# Patient Record
Sex: MALE | Race: WHITE | NOT HISPANIC OR LATINO | Employment: OTHER | URBAN - METROPOLITAN AREA
[De-identification: names, ages, dates, MRNs, and addresses within clinical notes are randomized per-mention and may not be internally consistent; named-entity substitution may affect disease eponyms.]

---

## 2018-08-27 RX ORDER — LORATADINE 10 MG/1
10 TABLET ORAL DAILY
COMMUNITY
End: 2018-08-28

## 2018-08-27 RX ORDER — ESOMEPRAZOLE MAGNESIUM 40 MG/1
40 CAPSULE, DELAYED RELEASE ORAL
COMMUNITY
End: 2018-08-28

## 2018-08-27 RX ORDER — NAPROXEN 500 MG/1
500 TABLET ORAL 2 TIMES DAILY WITH MEALS
COMMUNITY
End: 2018-08-28

## 2018-08-27 RX ORDER — MONTELUKAST SODIUM 10 MG/1
10 TABLET ORAL NIGHTLY
COMMUNITY
End: 2018-08-28

## 2018-08-27 RX ORDER — ALBUTEROL SULFATE 90 UG/1
2 AEROSOL, METERED RESPIRATORY (INHALATION) EVERY 4 HOURS PRN
COMMUNITY
End: 2018-08-28

## 2018-08-27 RX ORDER — ALBUTEROL SULFATE 0.63 MG/3ML
1 SOLUTION RESPIRATORY (INHALATION) EVERY 6 HOURS PRN
COMMUNITY
End: 2018-08-28

## 2018-08-27 RX ORDER — CYCLOBENZAPRINE HCL 10 MG
10 TABLET ORAL 3 TIMES DAILY PRN
COMMUNITY
End: 2018-08-28

## 2018-08-28 ENCOUNTER — OFFICE VISIT (OUTPATIENT)
Dept: NEUROSURGERY | Facility: CLINIC | Age: 28
End: 2018-08-28

## 2018-08-28 VITALS
TEMPERATURE: 98.1 F | DIASTOLIC BLOOD PRESSURE: 82 MMHG | WEIGHT: 211 LBS | HEIGHT: 74 IN | SYSTOLIC BLOOD PRESSURE: 130 MMHG | BODY MASS INDEX: 27.08 KG/M2

## 2018-08-28 DIAGNOSIS — M54.10 RADICULAR SYNDROME OF LEFT LEG: ICD-10-CM

## 2018-08-28 DIAGNOSIS — M51.26 HERNIATED LUMBAR INTERVERTEBRAL DISC: Primary | ICD-10-CM

## 2018-08-28 DIAGNOSIS — M51.36 DEGENERATIVE DISC DISEASE, LUMBAR: ICD-10-CM

## 2018-08-28 PROCEDURE — 99243 OFF/OP CNSLTJ NEW/EST LOW 30: CPT | Performed by: NEUROLOGICAL SURGERY

## 2018-08-28 RX ORDER — GABAPENTIN 300 MG/1
300 CAPSULE ORAL 3 TIMES DAILY
COMMUNITY
End: 2018-08-28

## 2018-08-28 RX ORDER — TRAMADOL HYDROCHLORIDE 50 MG/1
50 TABLET ORAL EVERY 8 HOURS PRN
Qty: 45 TABLET | Refills: 0 | Status: SHIPPED | OUTPATIENT
Start: 2018-08-28 | End: 2019-01-14 | Stop reason: SDUPTHER

## 2018-08-28 RX ORDER — NABUMETONE 750 MG/1
750 TABLET, FILM COATED ORAL 2 TIMES DAILY
Qty: 60 TABLET | Refills: 1 | Status: SHIPPED | OUTPATIENT
Start: 2018-08-28 | End: 2019-01-14 | Stop reason: CLARIF

## 2018-08-28 RX ORDER — METHOCARBAMOL 750 MG/1
750 TABLET, FILM COATED ORAL NIGHTLY
Qty: 30 TABLET | Refills: 1 | Status: SHIPPED | OUTPATIENT
Start: 2018-08-28 | End: 2019-01-14 | Stop reason: SDUPTHER

## 2018-08-28 NOTE — PROGRESS NOTES
Jm Henry  1990  [id number]      Chief Complaint   Patient presents with   • Back Pain   • Leg Pain     left       HISTORY OF PRESENT ILLNESS:  [This is a 28-year-old male presenting with a chief complaint of back and left leg pain of several months duration which has 10 to wax and wane.  He has some peers that are better than others yet it is always present.  A lumbar MRI was performed and he is referred for surgical consultation. ]    History reviewed. No pertinent past medical history.    Past Surgical History:   Procedure Laterality Date   • CHOLECYSTECTOMY     • KNEE SURGERY         History reviewed. No pertinent family history.    Social History     Social History   • Marital status:      Spouse name: N/A   • Number of children: N/A   • Years of education: N/A     Occupational History   • Not on file.     Social History Main Topics   • Smoking status: Never Smoker   • Smokeless tobacco: Never Used   • Alcohol use Yes   • Drug use: No   • Sexual activity: Defer     Other Topics Concern   • Not on file     Social History Narrative   • No narrative on file       No Known Allergies      Current Outpatient Prescriptions:   •  gabapentin (NEURONTIN) 300 MG capsule, Take 300 mg by mouth 3 (Three) Times a Day., Disp: , Rfl:   •  naproxen (NAPROSYN) 500 MG tablet, Take 500 mg by mouth 2 (Two) Times a Day With Meals., Disp: , Rfl:     Review of Systems   Constitutional: Negative for activity change, appetite change, chills, diaphoresis, fatigue, fever and unexpected weight change.   HENT: Negative for congestion, dental problem, drooling, ear discharge, ear pain, facial swelling, hearing loss, mouth sores, nosebleeds, postnasal drip, rhinorrhea, sinus pressure, sneezing, sore throat, tinnitus, trouble swallowing and voice change.    Eyes: Negative for photophobia, pain, discharge, redness, itching and visual disturbance.   Respiratory: Negative for apnea, cough, choking, chest tightness, shortness of  "breath, wheezing and stridor.    Cardiovascular: Negative for chest pain, palpitations and leg swelling.   Gastrointestinal: Negative for abdominal distention, abdominal pain, anal bleeding, blood in stool, constipation, diarrhea, nausea, rectal pain and vomiting.   Endocrine: Negative for cold intolerance, heat intolerance, polydipsia, polyphagia and polyuria.   Genitourinary: Positive for frequency. Negative for decreased urine volume, difficulty urinating, dysuria, enuresis, flank pain, genital sores, hematuria and urgency.   Musculoskeletal: Positive for back pain. Negative for arthralgias, gait problem, joint swelling, myalgias, neck pain and neck stiffness.   Skin: Negative for color change, pallor, rash and wound.   Allergic/Immunologic: Negative for environmental allergies, food allergies and immunocompromised state.   Neurological: Positive for weakness and numbness. Negative for dizziness, tremors, seizures, syncope, facial asymmetry, speech difficulty, light-headedness and headaches.   Hematological: Negative for adenopathy. Does not bruise/bleed easily.   Psychiatric/Behavioral: Negative for agitation, behavioral problems, confusion, decreased concentration, dysphoric mood, hallucinations, self-injury, sleep disturbance and suicidal ideas. The patient is not nervous/anxious and is not hyperactive.        Vitals:    08/28/18 1235   BP: 130/82   BP Location: Right arm   Patient Position: Sitting   Cuff Size: Adult   Temp: 98.1 °F (36.7 °C)   TempSrc: Temporal Artery    Weight: 95.7 kg (211 lb)   Height: 188 cm (74\")       Neurological Examination:    Mental status/speech: The patient is alert and oriented.  Speech is clear without aphysia or dysarthria.  No overt cognitive deficits.    Cranial nerve examination:    Olfaction: Smell is intact.  Vision: Vision is intact without visual field abnormalities.  Funduscopic examination is normal.  No pupillary irregularity.  Ocular motor examination: The " extraocular muscles are intact.  There is no diplopia.  The pupil is round and reactive to both light and accommodation.  There is no nystagmus.  Facial movement/sensation: There is no facial weakness.  Sensation is intact in the first, second, and third divisions of the trigeminal nerve.  The corneal reflex is intact.  Auditory: Hearing is intact to finger rub bilaterally.  Cranial nerves IX, X, XI, XII: Phonation is normal.  No dysphagia.  Tongue is protruded in the midline without atrophy.  The gag reflex is intact.  Shoulder shrug is normal.    Musculoligamentous ligamentous examination: [He has a positive straight leg raising on the left side.  There is no weakness, sensory loss or reflex asymmetry.  Diminished range of motion. ]    Medical Decision Making:     Diagnostic Data Set:  The lumbar MRI shows disc protrusion L4-L5 toward the left side providing anatomical/clinical correlation.      Assessment:  Symptomatic disc herniation          Recommendations:  I would recommend a continuation of conservative-ism.  I've send him to physical therapy; have given him a prescription of Relafen 750 mg twice a day, Robaxin 750 mg twice a day and tramadol for pain management.  He will call me on as-needed basis.  Should his symptoms persist and/or worsen surgery clearly is an option for him however I would want to make certain we have exhausted all other options.  I have discussed this with him in detail.        I greatly appreciate the opportunity to see and evaluate this individual.  If you have questions or concerns regarding issues that I may have overlooked please call me at any time: [phone].  Diallo Tavera M.D.  [facility]  [address] [place] [address]    Scribed for Linus Tavera MD by Ann Dahl CMA. 8/28/2018  1:17 PM     I have read and concur with the information provided by the scribe.  Linus Taevra MD

## 2019-01-09 ENCOUNTER — TELEPHONE (OUTPATIENT)
Dept: NEUROSURGERY | Facility: CLINIC | Age: 29
End: 2019-01-09

## 2019-01-09 NOTE — TELEPHONE ENCOUNTER
Provider:  Liang  Caller: Jm  Phone #:  [phone]  Last visit:   8/28/18  Next visit: n/a    KAY:         Reason for call:           Pt stating that no real benefit came from PT, is requesting f/u to discuss next step/surgery.

## 2019-01-14 ENCOUNTER — OFFICE VISIT (OUTPATIENT)
Dept: NEUROSURGERY | Facility: CLINIC | Age: 29
End: 2019-01-14

## 2019-01-14 VITALS
DIASTOLIC BLOOD PRESSURE: 72 MMHG | BODY MASS INDEX: 26.82 KG/M2 | WEIGHT: 209 LBS | SYSTOLIC BLOOD PRESSURE: 112 MMHG | TEMPERATURE: 98.5 F | HEIGHT: 74 IN

## 2019-01-14 DIAGNOSIS — M51.26 HERNIATED LUMBAR INTERVERTEBRAL DISC: Primary | ICD-10-CM

## 2019-01-14 DIAGNOSIS — M54.10 RADICULAR SYNDROME OF LEFT LEG: ICD-10-CM

## 2019-01-14 DIAGNOSIS — M51.36 DEGENERATIVE DISC DISEASE, LUMBAR: ICD-10-CM

## 2019-01-14 PROCEDURE — 99213 OFFICE O/P EST LOW 20 MIN: CPT | Performed by: NEUROLOGICAL SURGERY

## 2019-01-14 RX ORDER — METHOCARBAMOL 750 MG/1
750 TABLET, FILM COATED ORAL NIGHTLY
Qty: 30 TABLET | Refills: 1 | Status: SHIPPED | OUTPATIENT
Start: 2019-01-14 | End: 2019-01-24 | Stop reason: HOSPADM

## 2019-01-14 RX ORDER — MELOXICAM 7.5 MG/1
7.5 TABLET ORAL 2 TIMES DAILY
Qty: 30 TABLET | Refills: 0 | Status: SHIPPED | OUTPATIENT
Start: 2019-01-14 | End: 2019-01-24 | Stop reason: HOSPADM

## 2019-01-14 RX ORDER — TRAMADOL HYDROCHLORIDE 50 MG/1
50 TABLET ORAL EVERY 8 HOURS PRN
Qty: 45 TABLET | Refills: 0 | Status: ON HOLD | OUTPATIENT
Start: 2019-01-14 | End: 2019-01-23 | Stop reason: SDUPTHER

## 2019-01-14 NOTE — PROGRESS NOTES
Jm Henry  1990  [id number]                        CHIEF COMPLAINT:  Left leg pain          MEDICAL HISTORY SINCE LAST ENCOUNTER:  This 28-year-old male had a clinical diagnosis of disc protrusion at L5-S1 treated conservatively approximately 8 months ago.  He did well all did have recurrence in the change in his symptoms have gotten progressively worse.  He reports for reevaluation.  He has severe pain in his back radiating into his left leg.  Physical therapy has not been helpful.          History reviewed. No pertinent past medical history.           Past Surgical History:   Procedure Laterality Date   • CHOLECYSTECTOMY     • KNEE SURGERY              History reviewed. No pertinent family history.           Social History     Socioeconomic History   • Marital status:      Spouse name: Not on file   • Number of children: Not on file   • Years of education: Not on file   • Highest education level: Not on file   Social Needs   • Financial resource strain: Not on file   • Food insecurity - worry: Not on file   • Food insecurity - inability: Not on file   • Transportation needs - medical: Not on file   • Transportation needs - non-medical: Not on file   Occupational History   • Not on file   Tobacco Use   • Smoking status: Never Smoker   • Smokeless tobacco: Never Used   Substance and Sexual Activity   • Alcohol use: Yes   • Drug use: No   • Sexual activity: Defer   Other Topics Concern   • Not on file   Social History Narrative   • Not on file            No Known Allergies           Current Outpatient Medications:   •  methocarbamol (ROBAXIN) 750 MG tablet, Take 1 tablet by mouth Every Night., Disp: 30 tablet, Rfl: 1  •  nabumetone (RELAFEN) 750 MG tablet, Take 1 tablet by mouth 2 (Two) Times a Day., Disp: 60 tablet, Rfl: 1  •  traMADol (ULTRAM) 50 MG tablet, Take 1 tablet by mouth Every 8 (Eight) Hours As Needed for Moderate Pain ., Disp: 45 tablet, Rfl: 0         Review of Systems   Constitutional:  Positive for activity change and fatigue. Negative for appetite change, chills, diaphoresis, fever and unexpected weight change.   HENT: Negative for congestion, dental problem, drooling, ear discharge, ear pain, facial swelling, hearing loss, mouth sores, nosebleeds, postnasal drip, rhinorrhea, sinus pressure, sneezing, sore throat, tinnitus, trouble swallowing and voice change.    Eyes: Negative for photophobia, pain, discharge, redness, itching and visual disturbance.   Respiratory: Negative for apnea, cough, choking, chest tightness, shortness of breath, wheezing and stridor.    Cardiovascular: Negative for chest pain, palpitations and leg swelling.   Gastrointestinal: Negative for abdominal distention, abdominal pain, anal bleeding, blood in stool, constipation, diarrhea, nausea, rectal pain and vomiting.   Endocrine: Negative for cold intolerance, heat intolerance, polydipsia, polyphagia and polyuria.   Genitourinary: Negative for decreased urine volume, difficulty urinating, dysuria, enuresis, flank pain, frequency, genital sores, hematuria and urgency.   Musculoskeletal: Positive for arthralgias, back pain and gait problem. Negative for joint swelling, myalgias, neck pain and neck stiffness.   Skin: Negative for color change, pallor, rash and wound.   Allergic/Immunologic: Negative for environmental allergies, food allergies and immunocompromised state.   Neurological: Positive for dizziness, weakness, light-headedness and numbness. Negative for tremors, seizures, syncope, facial asymmetry, speech difficulty and headaches.   Hematological: Negative for adenopathy. Does not bruise/bleed easily.   Psychiatric/Behavioral: Positive for decreased concentration. Negative for agitation, behavioral problems, confusion, dysphoric mood, hallucinations, self-injury, sleep disturbance and suicidal ideas. The patient is not nervous/anxious and is not hyperactive.                Vitals:    01/14/19 1543   BP: 112/72   BP  "Location: Right arm   Patient Position: Sitting   Temp: 98.5 °F (36.9 °C)   TempSrc: Temporal   Weight: 94.8 kg (209 lb)   Height: 188 cm (74\")               EXAMINATION:  Has a positive straight leg raise on the left side and absent Achilles reflex (this was present previously).  No weakness or sensory loss.            MEDICAL DECISION MAKING:  His neurologic examination is consistent with an S1 radiculopathy secondary to disc protrusion L5-S1 on the left side.           ASSESSMENT/DISPOSITION: I will repeat his lumbar MRI.  Should this provide clinical correlation I would recommend discectomy.  We will get this done and I will keep you informed.               I APPRECIATE THE OPPORTUNITY OF THIS REFERRAL. PLEASE CALL IF ANY       QUESTIONS [phone]    Scribed for Linus Tavera MD by Ann Dahl CMA. 1/14/2019 3:56 PM     I have read and concur with the information provided by the scribe.  Linus Tavera MD      "

## 2019-01-14 NOTE — PATIENT INSTRUCTIONS
Call Ann after MRI- she will obtain scans and have Dr. Tavera review and call you back.       Call Dr. Tavera on a Monday or Tuesday with an update.    Ask for Carie () and leave a message for  Dr. Tavera.   He will call you back at the end of the day as soon as he can.     [phone]

## 2019-01-17 ENCOUNTER — TELEPHONE (OUTPATIENT)
Dept: NEUROSURGERY | Facility: CLINIC | Age: 29
End: 2019-01-17

## 2019-01-17 NOTE — TELEPHONE ENCOUNTER
Provider:  Liang  Caller: ojse  Time of call:   8:56  Phone #:  [phone]  Surgery:  NA  Surgery Date:  NA  Last visit:   1/14/2019  Next visit: LEI VILLANUEVA:         Reason for call:         Pt called stating that he had his MRI done at [facility] and wanted to know if he needed to mail the disc to us.  I spoke with Elham and she has requested the pt overnight the disc.  Pt has been informed and verbalized understanding.

## 2019-01-21 ENCOUNTER — PREP FOR SURGERY (OUTPATIENT)
Dept: OTHER | Facility: HOSPITAL | Age: 29
End: 2019-01-21

## 2019-01-21 DIAGNOSIS — M51.26 LUMBAR HERNIATED DISC: Primary | ICD-10-CM

## 2019-01-21 RX ORDER — IBUPROFEN 800 MG/1
800 TABLET ORAL ONCE
Status: CANCELLED | OUTPATIENT
Start: 2019-01-21 | End: 2019-01-21

## 2019-01-21 RX ORDER — SODIUM CHLORIDE 0.9 % (FLUSH) 0.9 %
3 SYRINGE (ML) INJECTION EVERY 12 HOURS SCHEDULED
Status: CANCELLED | OUTPATIENT
Start: 2019-01-21

## 2019-01-21 RX ORDER — ACETAMINOPHEN 325 MG/1
650 TABLET ORAL ONCE
Status: CANCELLED | OUTPATIENT
Start: 2019-01-21 | End: 2019-01-21

## 2019-01-21 RX ORDER — SODIUM CHLORIDE, SODIUM LACTATE, POTASSIUM CHLORIDE, CALCIUM CHLORIDE 600; 310; 30; 20 MG/100ML; MG/100ML; MG/100ML; MG/100ML
100 INJECTION, SOLUTION INTRAVENOUS CONTINUOUS
Status: CANCELLED | OUTPATIENT
Start: 2019-01-21

## 2019-01-21 RX ORDER — CEFAZOLIN SODIUM 2 G/100ML
2 INJECTION, SOLUTION INTRAVENOUS ONCE
Status: CANCELLED | OUTPATIENT
Start: 2019-01-21 | End: 2019-01-21

## 2019-01-21 RX ORDER — HYDROCODONE BITARTRATE AND ACETAMINOPHEN 7.5; 325 MG/1; MG/1
1 TABLET ORAL ONCE
Status: CANCELLED | OUTPATIENT
Start: 2019-01-21 | End: 2019-01-21

## 2019-01-21 RX ORDER — SODIUM CHLORIDE 0.9 % (FLUSH) 0.9 %
3-10 SYRINGE (ML) INJECTION AS NEEDED
Status: CANCELLED | OUTPATIENT
Start: 2019-01-21

## 2019-01-21 RX ORDER — PREGABALIN 150 MG/1
150 CAPSULE ORAL ONCE
Status: CANCELLED | OUTPATIENT
Start: 2019-01-21 | End: 2019-01-21

## 2019-01-21 NOTE — H&P (VIEW-ONLY)
Jm Henry  1990  [id number]                          CHIEF COMPLAINT:  Left leg pain           MEDICAL HISTORY SINCE LAST ENCOUNTER:  This 28-year-old male had a clinical diagnosis of disc protrusion at L4-L5 treated conservatively approximately 8 months ago.  He did well all did have recurrence in the change in his symptoms have gotten progressively worse.  He reports for reevaluation.  He has severe pain in his back radiating into his left leg.  Physical therapy has not been helpful.             Medical History   History reviewed. No pertinent past medical history.               Surgical History         Past Surgical History:   Procedure Laterality Date   • CHOLECYSTECTOMY       • KNEE SURGERY                     History reviewed. No pertinent family history.            Social History               Socioeconomic History   • Marital status:        Spouse name: Not on file   • Number of children: Not on file   • Years of education: Not on file   • Highest education level: Not on file   Social Needs   • Financial resource strain: Not on file   • Food insecurity - worry: Not on file   • Food insecurity - inability: Not on file   • Transportation needs - medical: Not on file   • Transportation needs - non-medical: Not on file   Occupational History   • Not on file   Tobacco Use   • Smoking status: Never Smoker   • Smokeless tobacco: Never Used   Substance and Sexual Activity   • Alcohol use: Yes   • Drug use: No   • Sexual activity: Defer   Other Topics Concern   • Not on file   Social History Narrative   • Not on file                 No Known Allergies            Current Outpatient Medications:   •  methocarbamol (ROBAXIN) 750 MG tablet, Take 1 tablet by mouth Every Night., Disp: 30 tablet, Rfl: 1  •  nabumetone (RELAFEN) 750 MG tablet, Take 1 tablet by mouth 2 (Two) Times a Day., Disp: 60 tablet, Rfl: 1  •  traMADol (ULTRAM) 50 MG tablet, Take 1 tablet by mouth Every 8 (Eight) Hours As Needed for  Moderate Pain ., Disp: 45 tablet, Rfl: 0          Review of Systems   Constitutional: Positive for activity change and fatigue. Negative for appetite change, chills, diaphoresis, fever and unexpected weight change.   HENT: Negative for congestion, dental problem, drooling, ear discharge, ear pain, facial swelling, hearing loss, mouth sores, nosebleeds, postnasal drip, rhinorrhea, sinus pressure, sneezing, sore throat, tinnitus, trouble swallowing and voice change.    Eyes: Negative for photophobia, pain, discharge, redness, itching and visual disturbance.   Respiratory: Negative for apnea, cough, choking, chest tightness, shortness of breath, wheezing and stridor.    Cardiovascular: Negative for chest pain, palpitations and leg swelling.   Gastrointestinal: Negative for abdominal distention, abdominal pain, anal bleeding, blood in stool, constipation, diarrhea, nausea, rectal pain and vomiting.   Endocrine: Negative for cold intolerance, heat intolerance, polydipsia, polyphagia and polyuria.   Genitourinary: Negative for decreased urine volume, difficulty urinating, dysuria, enuresis, flank pain, frequency, genital sores, hematuria and urgency.   Musculoskeletal: Positive for arthralgias, back pain and gait problem. Negative for joint swelling, myalgias, neck pain and neck stiffness.   Skin: Negative for color change, pallor, rash and wound.   Allergic/Immunologic: Negative for environmental allergies, food allergies and immunocompromised state.   Neurological: Positive for dizziness, weakness, light-headedness and numbness. Negative for tremors, seizures, syncope, facial asymmetry, speech difficulty and headaches.   Hematological: Negative for adenopathy. Does not bruise/bleed easily.   Psychiatric/Behavioral: Positive for decreased concentration. Negative for agitation, behavioral problems, confusion, dysphoric mood, hallucinations, self-injury, sleep disturbance and suicidal ideas. The patient is not  "nervous/anxious and is not hyperactive.                  Vitals       Vitals:     01/14/19 1543   BP: 112/72   BP Location: Right arm   Patient Position: Sitting   Temp: 98.5 °F (36.9 °C)   TempSrc: Temporal   Weight: 94.8 kg (209 lb)   Height: 188 cm (74\")                     EXAMINATION:  Has a positive straight leg raise on the left side and absent Achilles reflex (this was present previously).  No weakness or sensory loss.              MEDICAL DECISION MAKING:  His neurologic examination is consistent with an S1 radiculopathy secondary to disc protrusion L4-L5 on the left side.             ASSESSMENT/DISPOSITION: I will repeat his lumbar MRI.  Should this provide clinical correlation I would recommend discectomy.  We will get this done and I will keep you informed.     The lumbar MRI performed shows disc protrusion toward the left at L4-L5 compressing the nerve root.  Recommended discectomy.  He would like to proceed.          "

## 2019-01-21 NOTE — H&P
Jm Henry  1990  [id number]                          CHIEF COMPLAINT:  Left leg pain           MEDICAL HISTORY SINCE LAST ENCOUNTER:  This 28-year-old male had a clinical diagnosis of disc protrusion at L4-L5 treated conservatively approximately 8 months ago.  He did well all did have recurrence in the change in his symptoms have gotten progressively worse.  He reports for reevaluation.  He has severe pain in his back radiating into his left leg.  Physical therapy has not been helpful.             Medical History   History reviewed. No pertinent past medical history.               Surgical History         Past Surgical History:   Procedure Laterality Date   • CHOLECYSTECTOMY       • KNEE SURGERY                     History reviewed. No pertinent family history.            Social History               Socioeconomic History   • Marital status:        Spouse name: Not on file   • Number of children: Not on file   • Years of education: Not on file   • Highest education level: Not on file   Social Needs   • Financial resource strain: Not on file   • Food insecurity - worry: Not on file   • Food insecurity - inability: Not on file   • Transportation needs - medical: Not on file   • Transportation needs - non-medical: Not on file   Occupational History   • Not on file   Tobacco Use   • Smoking status: Never Smoker   • Smokeless tobacco: Never Used   Substance and Sexual Activity   • Alcohol use: Yes   • Drug use: No   • Sexual activity: Defer   Other Topics Concern   • Not on file   Social History Narrative   • Not on file                 No Known Allergies            Current Outpatient Medications:   •  methocarbamol (ROBAXIN) 750 MG tablet, Take 1 tablet by mouth Every Night., Disp: 30 tablet, Rfl: 1  •  nabumetone (RELAFEN) 750 MG tablet, Take 1 tablet by mouth 2 (Two) Times a Day., Disp: 60 tablet, Rfl: 1  •  traMADol (ULTRAM) 50 MG tablet, Take 1 tablet by mouth Every 8 (Eight) Hours As Needed for  Moderate Pain ., Disp: 45 tablet, Rfl: 0          Review of Systems   Constitutional: Positive for activity change and fatigue. Negative for appetite change, chills, diaphoresis, fever and unexpected weight change.   HENT: Negative for congestion, dental problem, drooling, ear discharge, ear pain, facial swelling, hearing loss, mouth sores, nosebleeds, postnasal drip, rhinorrhea, sinus pressure, sneezing, sore throat, tinnitus, trouble swallowing and voice change.    Eyes: Negative for photophobia, pain, discharge, redness, itching and visual disturbance.   Respiratory: Negative for apnea, cough, choking, chest tightness, shortness of breath, wheezing and stridor.    Cardiovascular: Negative for chest pain, palpitations and leg swelling.   Gastrointestinal: Negative for abdominal distention, abdominal pain, anal bleeding, blood in stool, constipation, diarrhea, nausea, rectal pain and vomiting.   Endocrine: Negative for cold intolerance, heat intolerance, polydipsia, polyphagia and polyuria.   Genitourinary: Negative for decreased urine volume, difficulty urinating, dysuria, enuresis, flank pain, frequency, genital sores, hematuria and urgency.   Musculoskeletal: Positive for arthralgias, back pain and gait problem. Negative for joint swelling, myalgias, neck pain and neck stiffness.   Skin: Negative for color change, pallor, rash and wound.   Allergic/Immunologic: Negative for environmental allergies, food allergies and immunocompromised state.   Neurological: Positive for dizziness, weakness, light-headedness and numbness. Negative for tremors, seizures, syncope, facial asymmetry, speech difficulty and headaches.   Hematological: Negative for adenopathy. Does not bruise/bleed easily.   Psychiatric/Behavioral: Positive for decreased concentration. Negative for agitation, behavioral problems, confusion, dysphoric mood, hallucinations, self-injury, sleep disturbance and suicidal ideas. The patient is not  "nervous/anxious and is not hyperactive.                  Vitals       Vitals:     01/14/19 1543   BP: 112/72   BP Location: Right arm   Patient Position: Sitting   Temp: 98.5 °F (36.9 °C)   TempSrc: Temporal   Weight: 94.8 kg (209 lb)   Height: 188 cm (74\")                     EXAMINATION:  Has a positive straight leg raise on the left side and absent Achilles reflex (this was present previously).  No weakness or sensory loss.              MEDICAL DECISION MAKING:  His neurologic examination is consistent with an S1 radiculopathy secondary to disc protrusion L4-L5 on the left side.             ASSESSMENT/DISPOSITION: I will repeat his lumbar MRI.  Should this provide clinical correlation I would recommend discectomy.  We will get this done and I will keep you informed.     The lumbar MRI performed shows disc protrusion toward the left at L4-L5 compressing the nerve root.  Recommended discectomy.  He would like to proceed.          "

## 2019-01-22 ENCOUNTER — APPOINTMENT (OUTPATIENT)
Dept: PREADMISSION TESTING | Facility: HOSPITAL | Age: 29
End: 2019-01-22

## 2019-01-22 VITALS — BODY MASS INDEX: 27.05 KG/M2 | WEIGHT: 210.76 LBS | HEIGHT: 74 IN

## 2019-01-22 DIAGNOSIS — M51.26 LUMBAR HERNIATED DISC: ICD-10-CM

## 2019-01-22 LAB
ALBUMIN SERPL-MCNC: 4.38 G/DL (ref 3.2–4.8)
ALBUMIN/GLOB SERPL: 2 G/DL (ref 1.5–2.5)
ALP SERPL-CCNC: 68 U/L (ref 25–100)
ALT SERPL W P-5'-P-CCNC: 35 U/L (ref 7–40)
ANION GAP SERPL CALCULATED.3IONS-SCNC: 5 MMOL/L (ref 3–11)
AST SERPL-CCNC: 25 U/L (ref 0–33)
BILIRUB SERPL-MCNC: 0.5 MG/DL (ref 0.3–1.2)
BUN BLD-MCNC: 15 MG/DL (ref 9–23)
BUN/CREAT SERPL: 13 (ref 7–25)
CALCIUM SPEC-SCNC: 8.8 MG/DL (ref 8.7–10.4)
CHLORIDE SERPL-SCNC: 107 MMOL/L (ref 99–109)
CO2 SERPL-SCNC: 28 MMOL/L (ref 20–31)
CREAT BLD-MCNC: 1.15 MG/DL (ref 0.6–1.3)
DEPRECATED RDW RBC AUTO: 39.7 FL (ref 37–54)
ERYTHROCYTE [DISTWIDTH] IN BLOOD BY AUTOMATED COUNT: 12.3 % (ref 11.3–14.5)
GFR SERPL CREATININE-BSD FRML MDRD: 75 ML/MIN/1.73
GLOBULIN UR ELPH-MCNC: 2.2 GM/DL
GLUCOSE BLD-MCNC: 70 MG/DL (ref 70–100)
HCT VFR BLD AUTO: 42.7 % (ref 38.9–50.9)
HGB BLD-MCNC: 14.4 G/DL (ref 13.1–17.5)
MCH RBC QN AUTO: 30.1 PG (ref 27–31)
MCHC RBC AUTO-ENTMCNC: 33.7 G/DL (ref 32–36)
MCV RBC AUTO: 89.3 FL (ref 80–99)
MRSA DNA SPEC QL NAA+PROBE: NEGATIVE
PLATELET # BLD AUTO: 173 10*3/MM3 (ref 150–450)
PMV BLD AUTO: 12.4 FL (ref 6–12)
POTASSIUM BLD-SCNC: 4 MMOL/L (ref 3.5–5.5)
PROT SERPL-MCNC: 6.6 G/DL (ref 5.7–8.2)
RBC # BLD AUTO: 4.78 10*6/MM3 (ref 4.2–5.76)
SODIUM BLD-SCNC: 140 MMOL/L (ref 132–146)
WBC NRBC COR # BLD: 9.24 10*3/MM3 (ref 3.5–10.8)

## 2019-01-22 PROCEDURE — 85027 COMPLETE CBC AUTOMATED: CPT | Performed by: NEUROLOGICAL SURGERY

## 2019-01-22 PROCEDURE — 36415 COLL VENOUS BLD VENIPUNCTURE: CPT

## 2019-01-22 PROCEDURE — 80053 COMPREHEN METABOLIC PANEL: CPT | Performed by: NEUROLOGICAL SURGERY

## 2019-01-22 PROCEDURE — 87641 MR-STAPH DNA AMP PROBE: CPT | Performed by: NEUROLOGICAL SURGERY

## 2019-01-22 NOTE — PAT
Patient to apply Chlorhexadine wipes  to surgical area (as instructed) the night before procedure and the AM of procedure. Wipes provided.    Bactroban given to patient with verbal instructions to use tonight.

## 2019-01-23 ENCOUNTER — ANESTHESIA (OUTPATIENT)
Dept: PERIOP | Facility: HOSPITAL | Age: 29
End: 2019-01-23

## 2019-01-23 ENCOUNTER — HOSPITAL ENCOUNTER (OUTPATIENT)
Facility: HOSPITAL | Age: 29
Discharge: HOME | End: 2019-01-24
Attending: NEUROLOGICAL SURGERY | Admitting: NEUROLOGICAL SURGERY

## 2019-01-23 ENCOUNTER — APPOINTMENT (OUTPATIENT)
Dept: GENERAL RADIOLOGY | Facility: HOSPITAL | Age: 29
End: 2019-01-23

## 2019-01-23 ENCOUNTER — ANESTHESIA EVENT (OUTPATIENT)
Dept: PERIOP | Facility: HOSPITAL | Age: 29
End: 2019-01-23

## 2019-01-23 DIAGNOSIS — Z74.09 IMPAIRED MOBILITY AND ADLS: ICD-10-CM

## 2019-01-23 DIAGNOSIS — Z74.09 IMPAIRED FUNCTIONAL MOBILITY, BALANCE, GAIT, AND ENDURANCE: Primary | ICD-10-CM

## 2019-01-23 DIAGNOSIS — M51.26 LUMBAR HERNIATED DISC: ICD-10-CM

## 2019-01-23 DIAGNOSIS — Z78.9 IMPAIRED MOBILITY AND ADLS: ICD-10-CM

## 2019-01-23 PROCEDURE — 25010000002 FENTANYL CITRATE (PF) 100 MCG/2ML SOLUTION: Performed by: NURSE ANESTHETIST, CERTIFIED REGISTERED

## 2019-01-23 PROCEDURE — 25010000002 NEOSTIGMINE 10 MG/10ML SOLUTION: Performed by: NURSE ANESTHETIST, CERTIFIED REGISTERED

## 2019-01-23 PROCEDURE — 25010000003 BUPIVACAINE LIPOSOME 1.3 % SUSPENSION: Performed by: NEUROLOGICAL SURGERY

## 2019-01-23 PROCEDURE — 63030 LAMOT DCMPRN NRV RT 1 LMBR: CPT | Performed by: PHYSICIAN ASSISTANT

## 2019-01-23 PROCEDURE — 72020 X-RAY EXAM OF SPINE 1 VIEW: CPT

## 2019-01-23 PROCEDURE — 25010000003 CEFAZOLIN IN DEXTROSE 2-4 GM/100ML-% SOLUTION: Performed by: NEUROLOGICAL SURGERY

## 2019-01-23 PROCEDURE — C9290 INJ, BUPIVACAINE LIPOSOME: HCPCS | Performed by: NEUROLOGICAL SURGERY

## 2019-01-23 PROCEDURE — 63710000001 DEXAMETHASONE PER 0.25 MG: Performed by: NEUROLOGICAL SURGERY

## 2019-01-23 PROCEDURE — 25010000002 ONDANSETRON PER 1 MG: Performed by: NURSE ANESTHETIST, CERTIFIED REGISTERED

## 2019-01-23 PROCEDURE — 25010000002 PROPOFOL 10 MG/ML EMULSION: Performed by: NURSE ANESTHETIST, CERTIFIED REGISTERED

## 2019-01-23 PROCEDURE — 63030 LAMOT DCMPRN NRV RT 1 LMBR: CPT | Performed by: NEUROLOGICAL SURGERY

## 2019-01-23 PROCEDURE — 25010000002 DEXAMETHASONE PER 1 MG: Performed by: NEUROLOGICAL SURGERY

## 2019-01-23 RX ORDER — NEOSTIGMINE METHYLSULFATE 1 MG/ML
INJECTION, SOLUTION INTRAVENOUS AS NEEDED
Status: DISCONTINUED | OUTPATIENT
Start: 2019-01-23 | End: 2019-01-23 | Stop reason: SURG

## 2019-01-23 RX ORDER — SODIUM CHLORIDE 0.9 % (FLUSH) 0.9 %
3-10 SYRINGE (ML) INJECTION AS NEEDED
Status: CANCELLED | OUTPATIENT
Start: 2019-01-23

## 2019-01-23 RX ORDER — HYDROMORPHONE HYDROCHLORIDE 1 MG/ML
0.5 INJECTION, SOLUTION INTRAMUSCULAR; INTRAVENOUS; SUBCUTANEOUS
Status: DISCONTINUED | OUTPATIENT
Start: 2019-01-23 | End: 2019-01-24 | Stop reason: HOSPADM

## 2019-01-23 RX ORDER — PROMETHAZINE HYDROCHLORIDE 12.5 MG/1
12.5 TABLET ORAL EVERY 6 HOURS PRN
Status: DISCONTINUED | OUTPATIENT
Start: 2019-01-23 | End: 2019-01-24 | Stop reason: HOSPADM

## 2019-01-23 RX ORDER — OXYCODONE AND ACETAMINOPHEN 7.5; 325 MG/1; MG/1
2 TABLET ORAL EVERY 4 HOURS PRN
Status: DISCONTINUED | OUTPATIENT
Start: 2019-01-23 | End: 2019-01-24 | Stop reason: HOSPADM

## 2019-01-23 RX ORDER — CEFAZOLIN SODIUM 2 G/100ML
2 INJECTION, SOLUTION INTRAVENOUS EVERY 8 HOURS
Status: COMPLETED | OUTPATIENT
Start: 2019-01-23 | End: 2019-01-24

## 2019-01-23 RX ORDER — PROPOFOL 10 MG/ML
VIAL (ML) INTRAVENOUS AS NEEDED
Status: DISCONTINUED | OUTPATIENT
Start: 2019-01-23 | End: 2019-01-23 | Stop reason: SURG

## 2019-01-23 RX ORDER — SODIUM CHLORIDE 0.9 % (FLUSH) 0.9 %
3 SYRINGE (ML) INJECTION EVERY 12 HOURS SCHEDULED
Status: CANCELLED | OUTPATIENT
Start: 2019-01-23

## 2019-01-23 RX ORDER — NALOXONE HCL 0.4 MG/ML
0.4 VIAL (ML) INJECTION AS NEEDED
Status: DISCONTINUED | OUTPATIENT
Start: 2019-01-23 | End: 2019-01-24 | Stop reason: HOSPADM

## 2019-01-23 RX ORDER — PROMETHAZINE HYDROCHLORIDE 12.5 MG/1
12.5 SUPPOSITORY RECTAL EVERY 6 HOURS PRN
Status: DISCONTINUED | OUTPATIENT
Start: 2019-01-23 | End: 2019-01-24 | Stop reason: HOSPADM

## 2019-01-23 RX ORDER — MAGNESIUM HYDROXIDE 1200 MG/15ML
LIQUID ORAL AS NEEDED
Status: DISCONTINUED | OUTPATIENT
Start: 2019-01-23 | End: 2019-01-23 | Stop reason: HOSPADM

## 2019-01-23 RX ORDER — NALOXONE HCL 0.4 MG/ML
0.4 VIAL (ML) INJECTION
Status: DISCONTINUED | OUTPATIENT
Start: 2019-01-23 | End: 2019-01-24 | Stop reason: HOSPADM

## 2019-01-23 RX ORDER — ACETAMINOPHEN 325 MG/1
650 TABLET ORAL ONCE
Status: COMPLETED | OUTPATIENT
Start: 2019-01-23 | End: 2019-01-23

## 2019-01-23 RX ORDER — MEPERIDINE HYDROCHLORIDE 50 MG/ML
12.5 INJECTION INTRAMUSCULAR; INTRAVENOUS; SUBCUTANEOUS
Status: DISCONTINUED | OUTPATIENT
Start: 2019-01-23 | End: 2019-01-24 | Stop reason: HOSPADM

## 2019-01-23 RX ORDER — PREGABALIN 150 MG/1
150 CAPSULE ORAL ONCE
Status: COMPLETED | OUTPATIENT
Start: 2019-01-23 | End: 2019-01-23

## 2019-01-23 RX ORDER — BACLOFEN 10 MG/1
10 TABLET ORAL 3 TIMES DAILY
Qty: 42 TABLET | Refills: 0 | Status: SHIPPED | OUTPATIENT
Start: 2019-01-23 | End: 2019-02-07

## 2019-01-23 RX ORDER — SODIUM CHLORIDE 0.9 % (FLUSH) 0.9 %
3 SYRINGE (ML) INJECTION EVERY 12 HOURS SCHEDULED
Status: DISCONTINUED | OUTPATIENT
Start: 2019-01-23 | End: 2019-01-24 | Stop reason: HOSPADM

## 2019-01-23 RX ORDER — IBUPROFEN 800 MG/1
800 TABLET ORAL ONCE
Status: COMPLETED | OUTPATIENT
Start: 2019-01-23 | End: 2019-01-23

## 2019-01-23 RX ORDER — BACLOFEN 10 MG/1
10 TABLET ORAL EVERY 6 HOURS
Status: DISCONTINUED | OUTPATIENT
Start: 2019-01-23 | End: 2019-01-24 | Stop reason: HOSPADM

## 2019-01-23 RX ORDER — DEXAMETHASONE SODIUM PHOSPHATE 4 MG/ML
4 INJECTION, SOLUTION INTRA-ARTICULAR; INTRALESIONAL; INTRAMUSCULAR; INTRAVENOUS; SOFT TISSUE EVERY 6 HOURS SCHEDULED
Status: DISCONTINUED | OUTPATIENT
Start: 2019-01-23 | End: 2019-01-24 | Stop reason: HOSPADM

## 2019-01-23 RX ORDER — LABETALOL HYDROCHLORIDE 5 MG/ML
5 INJECTION, SOLUTION INTRAVENOUS
Status: DISCONTINUED | OUTPATIENT
Start: 2019-01-23 | End: 2019-01-24 | Stop reason: HOSPADM

## 2019-01-23 RX ORDER — SODIUM CHLORIDE, SODIUM LACTATE, POTASSIUM CHLORIDE, CALCIUM CHLORIDE 600; 310; 30; 20 MG/100ML; MG/100ML; MG/100ML; MG/100ML
100 INJECTION, SOLUTION INTRAVENOUS CONTINUOUS
Status: DISCONTINUED | OUTPATIENT
Start: 2019-01-23 | End: 2019-01-24 | Stop reason: HOSPADM

## 2019-01-23 RX ORDER — EPHEDRINE SULFATE 50 MG/ML
5 INJECTION, SOLUTION INTRAVENOUS ONCE AS NEEDED
Status: DISCONTINUED | OUTPATIENT
Start: 2019-01-23 | End: 2019-01-24 | Stop reason: HOSPADM

## 2019-01-23 RX ORDER — HYDROCODONE BITARTRATE AND ACETAMINOPHEN 7.5; 325 MG/1; MG/1
1 TABLET ORAL ONCE
Status: COMPLETED | OUTPATIENT
Start: 2019-01-23 | End: 2019-01-23

## 2019-01-23 RX ORDER — HYDROCODONE BITARTRATE AND ACETAMINOPHEN 7.5; 325 MG/1; MG/1
1 TABLET ORAL EVERY 6 HOURS PRN
Qty: 45 TABLET | Refills: 0 | Status: SHIPPED | OUTPATIENT
Start: 2019-01-23 | End: 2023-03-31

## 2019-01-23 RX ORDER — TRAMADOL HYDROCHLORIDE 50 MG/1
50 TABLET ORAL EVERY 4 HOURS PRN
Qty: 45 TABLET | Refills: 1 | Status: SHIPPED | OUTPATIENT
Start: 2019-01-23 | End: 2023-03-31

## 2019-01-23 RX ORDER — SODIUM CHLORIDE, SODIUM LACTATE, POTASSIUM CHLORIDE, CALCIUM CHLORIDE 600; 310; 30; 20 MG/100ML; MG/100ML; MG/100ML; MG/100ML
9 INJECTION, SOLUTION INTRAVENOUS CONTINUOUS
Status: DISCONTINUED | OUTPATIENT
Start: 2019-01-23 | End: 2019-01-23

## 2019-01-23 RX ORDER — GABAPENTIN 100 MG/1
200 CAPSULE ORAL 3 TIMES DAILY
Qty: 84 CAPSULE | Refills: 0 | Status: SHIPPED | OUTPATIENT
Start: 2019-01-23 | End: 2019-02-07

## 2019-01-23 RX ORDER — FENTANYL CITRATE 50 UG/ML
INJECTION, SOLUTION INTRAMUSCULAR; INTRAVENOUS AS NEEDED
Status: DISCONTINUED | OUTPATIENT
Start: 2019-01-23 | End: 2019-01-23 | Stop reason: SURG

## 2019-01-23 RX ORDER — ONDANSETRON 2 MG/ML
INJECTION INTRAMUSCULAR; INTRAVENOUS AS NEEDED
Status: DISCONTINUED | OUTPATIENT
Start: 2019-01-23 | End: 2019-01-23 | Stop reason: SURG

## 2019-01-23 RX ORDER — ALPRAZOLAM 0.5 MG/1
0.5 TABLET ORAL 3 TIMES DAILY PRN
Status: DISCONTINUED | OUTPATIENT
Start: 2019-01-23 | End: 2019-01-24 | Stop reason: HOSPADM

## 2019-01-23 RX ORDER — ONDANSETRON 2 MG/ML
4 INJECTION INTRAMUSCULAR; INTRAVENOUS ONCE AS NEEDED
Status: DISCONTINUED | OUTPATIENT
Start: 2019-01-23 | End: 2019-01-24 | Stop reason: HOSPADM

## 2019-01-23 RX ORDER — CEFAZOLIN SODIUM 2 G/100ML
2 INJECTION, SOLUTION INTRAVENOUS ONCE
Status: COMPLETED | OUTPATIENT
Start: 2019-01-23 | End: 2019-01-23

## 2019-01-23 RX ORDER — DEXAMETHASONE 4 MG/1
4 TABLET ORAL EVERY 6 HOURS SCHEDULED
Status: DISCONTINUED | OUTPATIENT
Start: 2019-01-23 | End: 2019-01-24 | Stop reason: HOSPADM

## 2019-01-23 RX ORDER — SODIUM CHLORIDE 0.9 % (FLUSH) 0.9 %
1-10 SYRINGE (ML) INJECTION AS NEEDED
Status: DISCONTINUED | OUTPATIENT
Start: 2019-01-23 | End: 2019-01-24 | Stop reason: HOSPADM

## 2019-01-23 RX ORDER — TEMAZEPAM 15 MG/1
30 CAPSULE ORAL NIGHTLY PRN
Status: DISCONTINUED | OUTPATIENT
Start: 2019-01-23 | End: 2019-01-24 | Stop reason: HOSPADM

## 2019-01-23 RX ORDER — FAMOTIDINE 20 MG/1
20 TABLET, FILM COATED ORAL ONCE
Status: COMPLETED | OUTPATIENT
Start: 2019-01-23 | End: 2019-01-23

## 2019-01-23 RX ORDER — SODIUM CHLORIDE 9 MG/ML
INJECTION, SOLUTION INTRAVENOUS AS NEEDED
Status: DISCONTINUED | OUTPATIENT
Start: 2019-01-23 | End: 2019-01-23 | Stop reason: HOSPADM

## 2019-01-23 RX ORDER — GABAPENTIN 100 MG/1
200 CAPSULE ORAL EVERY 8 HOURS SCHEDULED
Status: DISCONTINUED | OUTPATIENT
Start: 2019-01-23 | End: 2019-01-24 | Stop reason: HOSPADM

## 2019-01-23 RX ORDER — ATRACURIUM BESYLATE 10 MG/ML
INJECTION, SOLUTION INTRAVENOUS AS NEEDED
Status: DISCONTINUED | OUTPATIENT
Start: 2019-01-23 | End: 2019-01-23 | Stop reason: SURG

## 2019-01-23 RX ORDER — FENTANYL CITRATE 50 UG/ML
50 INJECTION, SOLUTION INTRAMUSCULAR; INTRAVENOUS
Status: DISCONTINUED | OUTPATIENT
Start: 2019-01-23 | End: 2019-01-24 | Stop reason: HOSPADM

## 2019-01-23 RX ORDER — TRAMADOL HYDROCHLORIDE 50 MG/1
100 TABLET ORAL EVERY 4 HOURS PRN
Status: DISCONTINUED | OUTPATIENT
Start: 2019-01-23 | End: 2019-01-24 | Stop reason: HOSPADM

## 2019-01-23 RX ORDER — LIDOCAINE HYDROCHLORIDE 10 MG/ML
0.5 INJECTION, SOLUTION EPIDURAL; INFILTRATION; INTRACAUDAL; PERINEURAL ONCE AS NEEDED
Status: COMPLETED | OUTPATIENT
Start: 2019-01-23 | End: 2019-01-23

## 2019-01-23 RX ORDER — FAMOTIDINE 10 MG/ML
20 INJECTION, SOLUTION INTRAVENOUS ONCE
Status: CANCELLED | OUTPATIENT
Start: 2019-01-23 | End: 2019-01-23

## 2019-01-23 RX ORDER — PROMETHAZINE HYDROCHLORIDE 25 MG/ML
12.5 INJECTION, SOLUTION INTRAMUSCULAR; INTRAVENOUS EVERY 6 HOURS PRN
Status: DISCONTINUED | OUTPATIENT
Start: 2019-01-23 | End: 2019-01-24 | Stop reason: HOSPADM

## 2019-01-23 RX ORDER — ACETAMINOPHEN 500 MG
500 TABLET ORAL EVERY 6 HOURS
Status: DISCONTINUED | OUTPATIENT
Start: 2019-01-23 | End: 2019-01-24 | Stop reason: HOSPADM

## 2019-01-23 RX ORDER — GLYCOPYRROLATE 0.2 MG/ML
INJECTION INTRAMUSCULAR; INTRAVENOUS AS NEEDED
Status: DISCONTINUED | OUTPATIENT
Start: 2019-01-23 | End: 2019-01-23 | Stop reason: SURG

## 2019-01-23 RX ORDER — LIDOCAINE HYDROCHLORIDE 10 MG/ML
INJECTION, SOLUTION EPIDURAL; INFILTRATION; INTRACAUDAL; PERINEURAL AS NEEDED
Status: DISCONTINUED | OUTPATIENT
Start: 2019-01-23 | End: 2019-01-23 | Stop reason: SURG

## 2019-01-23 RX ORDER — SODIUM CHLORIDE AND POTASSIUM CHLORIDE 150; 450 MG/100ML; MG/100ML
75 INJECTION, SOLUTION INTRAVENOUS CONTINUOUS
Status: DISCONTINUED | OUTPATIENT
Start: 2019-01-23 | End: 2019-01-24 | Stop reason: HOSPADM

## 2019-01-23 RX ADMIN — ATRACURIUM BESYLATE 20 MG: 10 INJECTION, SOLUTION INTRAVENOUS at 11:56

## 2019-01-23 RX ADMIN — OXYCODONE HYDROCHLORIDE AND ACETAMINOPHEN 2 TABLET: 7.5; 325 TABLET ORAL at 21:51

## 2019-01-23 RX ADMIN — POTASSIUM CHLORIDE AND SODIUM CHLORIDE 75 ML/HR: 450; 150 INJECTION, SOLUTION INTRAVENOUS at 17:37

## 2019-01-23 RX ADMIN — ATRACURIUM BESYLATE 50 MG: 10 INJECTION, SOLUTION INTRAVENOUS at 11:42

## 2019-01-23 RX ADMIN — DEXAMETHASONE SODIUM PHOSPHATE 10 MG: 4 INJECTION, SOLUTION INTRAMUSCULAR; INTRAVENOUS at 11:49

## 2019-01-23 RX ADMIN — HYDROCODONE BITARTRATE AND ACETAMINOPHEN 1 TABLET: 7.5; 325 TABLET ORAL at 09:19

## 2019-01-23 RX ADMIN — BACLOFEN 10 MG: 10 TABLET ORAL at 17:38

## 2019-01-23 RX ADMIN — ACETAMINOPHEN 500 MG: 500 TABLET, FILM COATED ORAL at 17:38

## 2019-01-23 RX ADMIN — CEFAZOLIN SODIUM 2 G: 2 INJECTION, SOLUTION INTRAVENOUS at 11:39

## 2019-01-23 RX ADMIN — IBUPROFEN 800 MG: 800 TABLET ORAL at 09:18

## 2019-01-23 RX ADMIN — LIDOCAINE HYDROCHLORIDE 0.5 ML: 10 INJECTION, SOLUTION EPIDURAL; INFILTRATION; INTRACAUDAL; PERINEURAL at 09:19

## 2019-01-23 RX ADMIN — ONDANSETRON 4 MG: 2 INJECTION INTRAMUSCULAR; INTRAVENOUS at 13:25

## 2019-01-23 RX ADMIN — LIDOCAINE HYDROCHLORIDE 50 MG: 10 INJECTION, SOLUTION EPIDURAL; INFILTRATION; INTRACAUDAL; PERINEURAL at 11:42

## 2019-01-23 RX ADMIN — PROPOFOL 200 MG: 10 INJECTION, EMULSION INTRAVENOUS at 11:42

## 2019-01-23 RX ADMIN — DEXAMETHASONE 4 MG: 4 TABLET ORAL at 17:38

## 2019-01-23 RX ADMIN — FENTANYL CITRATE 100 MCG: 50 INJECTION, SOLUTION INTRAMUSCULAR; INTRAVENOUS at 11:42

## 2019-01-23 RX ADMIN — SODIUM CHLORIDE, POTASSIUM CHLORIDE, SODIUM LACTATE AND CALCIUM CHLORIDE: 600; 310; 30; 20 INJECTION, SOLUTION INTRAVENOUS at 13:10

## 2019-01-23 RX ADMIN — PREGABALIN 150 MG: 150 CAPSULE ORAL at 09:18

## 2019-01-23 RX ADMIN — CEFAZOLIN SODIUM 2 G: 2 INJECTION, SOLUTION INTRAVENOUS at 20:29

## 2019-01-23 RX ADMIN — GLYCOPYRROLATE 0.4 MG: 0.2 INJECTION, SOLUTION INTRAMUSCULAR; INTRAVENOUS at 13:39

## 2019-01-23 RX ADMIN — GABAPENTIN 200 MG: 100 CAPSULE ORAL at 20:29

## 2019-01-23 RX ADMIN — ACETAMINOPHEN 650 MG: 325 TABLET, FILM COATED ORAL at 09:18

## 2019-01-23 RX ADMIN — SODIUM CHLORIDE, POTASSIUM CHLORIDE, SODIUM LACTATE AND CALCIUM CHLORIDE 9 ML/HR: 600; 310; 30; 20 INJECTION, SOLUTION INTRAVENOUS at 09:19

## 2019-01-23 RX ADMIN — NEOSTIGMINE METHYLSULFATE 3 MG: 1 INJECTION, SOLUTION INTRAVENOUS at 13:39

## 2019-01-23 RX ADMIN — FAMOTIDINE 20 MG: 20 TABLET, FILM COATED ORAL at 09:18

## 2019-01-23 NOTE — ANESTHESIA PREPROCEDURE EVALUATION
Anesthesia Evaluation     Patient summary reviewed and Nursing notes reviewed   no history of anesthetic complications:  NPO Solid Status: > 8 hours  NPO Liquid Status: > 2 hours           Airway   Mallampati: II  TM distance: >3 FB  Neck ROM: full  Small opening and Possible difficult intubation  Dental      Pulmonary - negative pulmonary ROS and normal exam   Cardiovascular - negative cardio ROS and normal exam        Neuro/Psych  (+) numbness (left leg),     GI/Hepatic/Renal/Endo    (+)  GERD well controlled,    (-) hepatitis, liver disease, no renal disease, diabetes, hypothyroidism    Musculoskeletal     (+) back pain, radiculopathy Left lower extremity  Abdominal    Substance History      OB/GYN          Other   (+) arthritis                   Anesthesia Plan    ASA 1     general     intravenous induction   Anesthetic plan, all risks, benefits, and alternatives have been provided, discussed and informed consent has been obtained with: patient.    Plan discussed with CRNA.

## 2019-01-23 NOTE — OP NOTE
Preoperative diagnosis: Herniated intervertebral disc L4-L5, left    Postoperative diagnosis: Same    Operation performed: Excision of herniated disc L4-L5, left    Surgeon: Linus Tavera  Assistant: Antonia Verma    Indication:This is a 28-year-old who presents with relentless back and left leg pain failing to respond to conservativism secondary to disc protrusion noted on MRI at L4-L5.  He was admitted for surgical intervention..  The risks and benefits of the procedure were discussed with the patient preoperatively.  Consent forms were given and signatures obtained.  All questions were answered satisfactorily and the patient wished to proceed with with the procedure.    Operative report:     Subsequent to the induction of general anesthesia the patient was placed in a prone and flexed position.  Prep and drape was performed in the usual fashion.  Through a linear incision, the paraspinal musculature was dissected exposing the spinous process and lamina of .  The appropriate level was determined radiographically.    A self-retaining retractor was inserted.  A laminotomy was performed using the Kerrison punch and Mid[employer] Josh high-speed drill.  Medial facetectomy and foraminotomy was carried out..  The ligamentum flavum was excised and the nerve root identified.    The surgical findings included : The disc protrusion was primarily within the axilla of the nerve root.  Dissection was done both medial and lateral to the L5 nerve root.  Several large fragments were removed and the nerve root and dural sores were well decompressed.    The nerve root and dura were widely decompressed.  A blunt probe passed freely through the neural foramen.  No additional compressive elements were identified.  Hemostasis was obtained with the use of Gelfoam and FloSeal.  The wound was closed with multiple layers of Vicryl.  A sterile dressing was applied and the patient was taken to recovery in satisfactory  condition.    Complications:None noted.

## 2019-01-23 NOTE — ANESTHESIA POSTPROCEDURE EVALUATION
Patient: Pardeep Henry    Procedure Summary     Date:  01/23/19 Room / [facility]:   [facility]    Anesthesia Start:  1140 Anesthesia Stop:      Procedure:  LUMBAR DISCECTOMY L4-5 LEFT (Left Spine Lumbar) Diagnosis:       Lumbar herniated disc      (Lumbar herniated disc [M51.26])    Surgeon:  Linus Tavera MD Provider:  Damien Balderas MD    Anesthesia Type:  general ASA Status:  1          Anesthesia Type: general  Last vitals  /80       Temp98       Pulse78       [id number]         [id number]         Post Anesthesia Care and Evaluation    Patient location during evaluation: PACU  Patient participation: complete - patient participated  Level of consciousness: awake and alert  Pain score: 0  Pain management: adequate  Airway patency: patent  Anesthetic complications: No anesthetic complications  PONV Status: none  Cardiovascular status: hemodynamically stable and acceptable  Respiratory status: nonlabored ventilation, acceptable and nasal cannula  Hydration status: acceptable

## 2019-01-23 NOTE — PROGRESS NOTES
FOLLOW UP ENCOUNTER          CHIEF COMPLAINT::   Night of surgery                        MEDICAL HISTORY SINCE LAST ENCOUNTER :   Is doing well thus far with the relief of his radiculopathy.                                      ASSESSMENT/DISPOSITION :    Anticipate discharge in a.m.  Appropriate arrangements in process.

## 2019-01-23 NOTE — DISCHARGE SUMMARY
Date of Discharge:  1/23/2019    Bradley Paz MD    Discharge Diagnosis: Herniated intervertebral disc L4-L5, left    Procedures Performed  Procedure(s):  LUMBAR DISCECTOMY L4-5 LEFT       Summary of Hospitalization: This is a 28-year-old admitted at this time for discectomy for treatment of disc herniation L4-L5 on the left side manifesting his intractable pain in the distribution of L5.    Surgical intervention was obtained and achieved.  Fragment disc material was found within the axilla as well as lateral.  The nerve root was decompressed.    He did well postoperatively with alleviation of his radicular symptoms.  [facility].        Condition on Discharge:  stable    Discharge Disposition  Home or Self Care    Discharge Medications     Discharge Medications      New Medications      Instructions Start Date   baclofen 10 MG tablet  Commonly known as:  LIORESAL   10 mg, Oral, 3 Times Daily      gabapentin 100 MG capsule  Commonly known as:  NEURONTIN   200 mg, Oral, 3 Times Daily      HYDROcodone-acetaminophen 7.5-325 MG per tablet  Commonly known as:  NORCO   1 tablet, Oral, Every 6 Hours PRN         Changes to Medications      Instructions Start Date   traMADol 50 MG tablet  Commonly known as:  ULTRAM  What changed:  when to take this   50 mg, Oral, Every 4 Hours PRN         Stop These Medications    meloxicam 7.5 MG tablet  Commonly known as:  MOBIC     methocarbamol 750 MG tablet  Commonly known as:  ROBAXIN              Follow-up Appointments  No future appointments.  Additional Instructions for the Follow-ups that You Need to Schedule     Discharge Follow-up with [facility]; 6 Weeks   As directed      Specialty:  [facility]    Follow Up:  6 Weeks    Follow Up Details:  Follow-up with Luci/regis in 6 weeks.                  Linus Tavera MD  01/23/19  6:00 PM

## 2019-01-23 NOTE — BRIEF OP NOTE
LUMBAR DISCECTOMY POSTERIOR 1-2 LEVELS  Progress Note    Pardeep Henry  1/23/2019    Pre-op Diagnosis:   Lumbar herniated disc [M51.26]       Post-Op Diagnosis Codes:     * Lumbar herniated disc [M51.26]    Procedure/CPT® Codes:      Procedure(s):  LUMBAR DISCECTOMY L4-5 LEFT    Surgeon(s):  Linus Tavera MD    Anesthesia: General    Staff:   Circulator: Almaz Morales RN  Radiology Technologist: Twin Hernandez RT  Scrub Person: Russell Grigsby; Jackelin Aldana; Sudha Milton  Nursing Assistant: Deborah Smith  Assistant: Nichole Verma PA-C    Estimated Blood Loss: minimal    Urine Voided: * No values recorded between 1/23/2019 11:39 AM and 1/23/2019  1:35 PM *    Specimens:                None      Drains:      Findings: axcillary herniation    Complications: none    Linus Tavera MD     Date: 1/23/2019  Time: 1:35 PM

## 2019-01-23 NOTE — ANESTHESIA PROCEDURE NOTES
Airway  Urgency: elective    Airway not difficult    General Information and Staff    Patient location during procedure: OR  CRNA: Otto Sanders CRNA    Indications and Patient Condition  Indications for airway management: airway protection    Preoxygenated: yes  MILS not maintained throughout  Mask difficulty assessment: 1 - vent by mask    Final Airway Details  Final airway type: endotracheal airway      Successful airway: ETT  Cuffed: yes   Successful intubation technique: direct laryngoscopy  Endotracheal tube insertion site: oral  Blade: Miles  Blade size: 2  ETT size (mm): 7.5  Cormack-Lehane Classification: grade I - full view of glottis  Placement verified by: chest auscultation and capnometry   Cuff volume (mL): 5  Measured from: lips  ETT to lips (cm): 22  Number of attempts at approach: 1    Additional Comments  Negative epigastric sounds, Breath sound equal bilaterally with symmetric chest rise and fall

## 2019-01-23 NOTE — INTERVAL H&P NOTE
Pre-Op H&P  Pardeep Henry  [id number]  1990    Chief complaint: Left leg pain    HPI:    Patient is a 28 y.o.male who presents with left leg pain and a clinical diagnosis of disc protrusion at L4-L5. He was treated conservatively approximately 8 months ago.  He did well initially, but had a recurrence of symptoms that have gotten progressively worse. He has severe pain in his back radiating into his left leg.  Physical therapy has not been helpful.  A lumbar MRI performed shows disc protrusion toward the left at L4-L5 compressing the nerve root. He has elected to proceed with surgery to include a left lumbar discectomy L4-L5.      Review of Systems:  General ROS: negative for chills, fever or skin lesions;  No changes since last office visit  Cardiovascular ROS: no chest pain or dyspnea on exertion  Respiratory ROS: no cough, shortness of breath, or wheezing    Allergies: No Known Allergies    Home Meds:    No current facility-administered medications on file prior to encounter.      Current Outpatient Medications on File Prior to Encounter   Medication Sig Dispense Refill   • meloxicam (MOBIC) 7.5 MG tablet Take 1 tablet by mouth 2 (Two) Times a Day. 30 tablet 0   • methocarbamol (ROBAXIN) 750 MG tablet Take 1 tablet by mouth Every Night. 30 tablet 1   • traMADol (ULTRAM) 50 MG tablet Take 1 tablet by mouth Every 8 (Eight) Hours As Needed for Moderate Pain . 45 tablet 0       PMH: History reviewed. No pertinent past medical history.  PSH:    Past Surgical History:   Procedure Laterality Date   • CHOLECYSTECTOMY     • KNEE SURGERY      x3   • OTHER SURGICAL HISTORY      pyloric stenosis surgery as an infant        Social History:   Tobacco:   Social History     Tobacco Use   Smoking Status Never Smoker   Smokeless Tobacco Never Used      Alcohol:     Social History     Substance and Sexual Activity   Alcohol Use Yes    Comment: rare       Vitals:  HR 81, /86, RR 16, O2 97%, afebrile    Physical  Exam:  General Appearance:    Alert, cooperative, no distress, appears stated age   Head:    Normocephalic, without obvious abnormality, atraumatic   Lungs:     Clear to auscultation bilaterally, respirations unlabored    Heart:   Regular rate and rhythm, S1 and S2 normal, no murmur, rub    or gallop    Abdomen:    Soft, non-tender, +bowel sounds   Breast Exam:    deferred   Genitalia:    deferred   Extremities:   Extremities normal, atraumatic, no cyanosis or edema   Skin:   Skin color, texture, turgor normal, no rashes or lesions   Neurologic:   Grossly intact   Results Review  I have reviewed the patient's recent clinical results.    Cancer Staging (if applicable)  Cancer Patient: __ yes _X_no __unknown; If yes, clinical stage T:__ N:__M:__, stage group or __N/A    Impression: Herniated disc L4-L5    Plan: Left lumbar discectomy L4-L5      Cristela Villar, APRN   1/23/2019   10:42 AM

## 2019-01-24 VITALS
HEIGHT: 74 IN | TEMPERATURE: 98.1 F | HEART RATE: 70 BPM | OXYGEN SATURATION: 96 % | WEIGHT: 210.76 LBS | BODY MASS INDEX: 27.05 KG/M2 | RESPIRATION RATE: 16 BRPM | SYSTOLIC BLOOD PRESSURE: 131 MMHG | DIASTOLIC BLOOD PRESSURE: 71 MMHG

## 2019-01-24 PROCEDURE — 63710000001 DEXAMETHASONE PER 0.25 MG: Performed by: NEUROLOGICAL SURGERY

## 2019-01-24 PROCEDURE — 97110 THERAPEUTIC EXERCISES: CPT

## 2019-01-24 PROCEDURE — 97161 PT EVAL LOW COMPLEX 20 MIN: CPT

## 2019-01-24 PROCEDURE — 97165 OT EVAL LOW COMPLEX 30 MIN: CPT

## 2019-01-24 PROCEDURE — 25010000003 CEFAZOLIN IN DEXTROSE 2-4 GM/100ML-% SOLUTION: Performed by: NEUROLOGICAL SURGERY

## 2019-01-24 PROCEDURE — 97116 GAIT TRAINING THERAPY: CPT

## 2019-01-24 RX ADMIN — DEXAMETHASONE 4 MG: 4 TABLET ORAL at 04:58

## 2019-01-24 RX ADMIN — ACETAMINOPHEN 500 MG: 500 TABLET, FILM COATED ORAL at 04:58

## 2019-01-24 RX ADMIN — GABAPENTIN 200 MG: 100 CAPSULE ORAL at 04:58

## 2019-01-24 RX ADMIN — ACETAMINOPHEN 500 MG: 500 TABLET, FILM COATED ORAL at 00:06

## 2019-01-24 RX ADMIN — CEFAZOLIN SODIUM 2 G: 2 INJECTION, SOLUTION INTRAVENOUS at 04:47

## 2019-01-24 RX ADMIN — BACLOFEN 10 MG: 10 TABLET ORAL at 04:59

## 2019-01-24 RX ADMIN — BACLOFEN 10 MG: 10 TABLET ORAL at 00:06

## 2019-01-24 RX ADMIN — DEXAMETHASONE 4 MG: 4 TABLET ORAL at 00:06

## 2019-01-24 NOTE — DISCHARGE PLACEMENT REQUEST
"Mayo Henry (28 y.o. Male)     Date of Birth Social Security Number Address Home Phone MRN    1990  [address] [place] [address] [phone] [id number]    Denominational Marital Status          Anabaptist        Admission Date Admission Type Admitting Provider Attending Provider Department, Room/Bed    1/23/19 Elective Linus Tavera MD  [address], S[address]/1    Discharge Date Discharge Disposition Discharge Destination        1/24/2019 Home or Self Care              Attending Provider:  (none)   Allergies:  No Known Allergies    Isolation:  None   Infection:  None   Code Status:  Prior    Ht:  188 cm (74.02\")   Wt:  95.6 kg (210 lb 12.2 oz)    Admission Cmt:  None   Principal Problem:  Lumbar herniated disc [M51.26] More...                 Active Insurance as of 1/23/2019     Primary Coverage     Payor Plan Insurance Group Employer/Plan Group    HU[employer] MEDICAID [employer] [id number]     Payor Plan Address Payor Plan Phone Number Payor Plan Fax Number Effective Dates    PO  [phone]  8/8/2018 - None Entered    [facility] [id number]       Subscriber Name Subscriber Birth Date Member ID       MAYO HENRY 1990 [id number]                 Emergency Contacts      (Rel.) Home Phone Work Phone Mobile Phone    Kiki Henry (Spouse) [phone] -- --            Insurance Information                [employer] MEDICA[employer] Phone: [phone]    Subscriber: Mayo Henry Subscriber#: [id number]    Group#: [id number] Precert#:              History & Physical      Cristela Villar, APRN at 1/23/2019 10:37 AM          Pre-Op H&P  Mayo Henry  [id number]  1990    Chief complaint: Left leg pain    HPI:    Patient is a 28 y.o.male who presents with left leg pain and a clinical diagnosis of disc protrusion at L4-L5. He was treated conservatively approximately 8 months ago.  He did well initially, but had a recurrence of " symptoms that have gotten progressively worse. He has severe pain in his back radiating into his left leg.  Physical therapy has not been helpful.  A lumbar MRI performed shows disc protrusion toward the left at L4-L5 compressing the nerve root. He has elected to proceed with surgery to include a left lumbar discectomy L4-L5.      Review of Systems:  General ROS: negative for chills, fever or skin lesions;  No changes since last office visit  Cardiovascular ROS: no chest pain or dyspnea on exertion  Respiratory ROS: no cough, shortness of breath, or wheezing    Allergies: No Known Allergies    Home Meds:    No current facility-administered medications on file prior to encounter.      Current Outpatient Medications on File Prior to Encounter   Medication Sig Dispense Refill   • meloxicam (MOBIC) 7.5 MG tablet Take 1 tablet by mouth 2 (Two) Times a Day. 30 tablet 0   • methocarbamol (ROBAXIN) 750 MG tablet Take 1 tablet by mouth Every Night. 30 tablet 1   • traMADol (ULTRAM) 50 MG tablet Take 1 tablet by mouth Every 8 (Eight) Hours As Needed for Moderate Pain . 45 tablet 0       PMH: History reviewed. No pertinent past medical history.  PSH:    Past Surgical History:   Procedure Laterality Date   • CHOLECYSTECTOMY     • KNEE SURGERY      x3   • OTHER SURGICAL HISTORY      pyloric stenosis surgery as an infant        Social History:   Tobacco:   Social History     Tobacco Use   Smoking Status Never Smoker   Smokeless Tobacco Never Used      Alcohol:     Social History     Substance and Sexual Activity   Alcohol Use Yes    Comment: rare       Vitals:  HR 81, /86, RR 16, O2 97%, afebrile    Physical Exam:  General Appearance:    Alert, cooperative, no distress, appears stated age   Head:    Normocephalic, without obvious abnormality, atraumatic   Lungs:     Clear to auscultation bilaterally, respirations unlabored    Heart:   Regular rate and rhythm, S1 and S2 normal, no murmur, rub    or gallop    Abdomen:     Soft, non-tender, +bowel sounds   Breast Exam:    deferred   Genitalia:    deferred   Extremities:   Extremities normal, atraumatic, no cyanosis or edema   Skin:   Skin color, texture, turgor normal, no rashes or lesions   Neurologic:   Grossly intact   Results Review  I have reviewed the patient's recent clinical results.    Cancer Staging (if applicable)  Cancer Patient: __ yes _X_no __unknown; If yes, clinical stage T:__ N:__M:__, stage group or __N/A    Impression: Herniated disc L4-L5    Plan: Left lumbar discectomy L4-L5      Cristela Villar, APRN   1/23/2019   10:42 AM    Electronically signed by Linus Tavera MD at 1/23/2019 11:10 AM   Source Note             Jm Carl  1990  [id number]                          CHIEF COMPLAINT:  Left leg pain           MEDICAL HISTORY SINCE LAST ENCOUNTER:  This 28-year-old male had a clinical diagnosis of disc protrusion at L4-L5 treated conservatively approximately 8 months ago.  He did well all did have recurrence in the change in his symptoms have gotten progressively worse.  He reports for reevaluation.  He has severe pain in his back radiating into his left leg.  Physical therapy has not been helpful.             Medical History   History reviewed. No pertinent past medical history.               Surgical History         Past Surgical History:   Procedure Laterality Date   • CHOLECYSTECTOMY       • KNEE SURGERY                     History reviewed. No pertinent family history.            Social History               Socioeconomic History   • Marital status:        Spouse name: Not on file   • Number of children: Not on file   • Years of education: Not on file   • Highest education level: Not on file   Social Needs   • Financial resource strain: Not on file   • Food insecurity - worry: Not on file   • Food insecurity - inability: Not on file   • Transportation needs - medical: Not on file   • Transportation needs - non-medical: Not on file    Occupational History   • Not on file   Tobacco Use   • Smoking status: Never Smoker   • Smokeless tobacco: Never Used   Substance and Sexual Activity   • Alcohol use: Yes   • Drug use: No   • Sexual activity: Defer   Other Topics Concern   • Not on file   Social History Narrative   • Not on file                 No Known Allergies            Current Outpatient Medications:   •  methocarbamol (ROBAXIN) 750 MG tablet, Take 1 tablet by mouth Every Night., Disp: 30 tablet, Rfl: 1  •  nabumetone (RELAFEN) 750 MG tablet, Take 1 tablet by mouth 2 (Two) Times a Day., Disp: 60 tablet, Rfl: 1  •  traMADol (ULTRAM) 50 MG tablet, Take 1 tablet by mouth Every 8 (Eight) Hours As Needed for Moderate Pain ., Disp: 45 tablet, Rfl: 0          Review of Systems   Constitutional: Positive for activity change and fatigue. Negative for appetite change, chills, diaphoresis, fever and unexpected weight change.   HENT: Negative for congestion, dental problem, drooling, ear discharge, ear pain, facial swelling, hearing loss, mouth sores, nosebleeds, postnasal drip, rhinorrhea, sinus pressure, sneezing, sore throat, tinnitus, trouble swallowing and voice change.    Eyes: Negative for photophobia, pain, discharge, redness, itching and visual disturbance.   Respiratory: Negative for apnea, cough, choking, chest tightness, shortness of breath, wheezing and stridor.    Cardiovascular: Negative for chest pain, palpitations and leg swelling.   Gastrointestinal: Negative for abdominal distention, abdominal pain, anal bleeding, blood in stool, constipation, diarrhea, nausea, rectal pain and vomiting.   Endocrine: Negative for cold intolerance, heat intolerance, polydipsia, polyphagia and polyuria.   Genitourinary: Negative for decreased urine volume, difficulty urinating, dysuria, enuresis, flank pain, frequency, genital sores, hematuria and urgency.   Musculoskeletal: Positive for arthralgias, back pain and gait problem. Negative for joint  "swelling, myalgias, neck pain and neck stiffness.   Skin: Negative for color change, pallor, rash and wound.   Allergic/Immunologic: Negative for environmental allergies, food allergies and immunocompromised state.   Neurological: Positive for dizziness, weakness, light-headedness and numbness. Negative for tremors, seizures, syncope, facial asymmetry, speech difficulty and headaches.   Hematological: Negative for adenopathy. Does not bruise/bleed easily.   Psychiatric/Behavioral: Positive for decreased concentration. Negative for agitation, behavioral problems, confusion, dysphoric mood, hallucinations, self-injury, sleep disturbance and suicidal ideas. The patient is not nervous/anxious and is not hyperactive.                  Vitals       Vitals:     01/14/19 1543   BP: 112/72   BP Location: Right arm   Patient Position: Sitting   Temp: 98.5 °F (36.9 °C)   TempSrc: Temporal   Weight: 94.8 kg (209 lb)   Height: 188 cm (74\")                     EXAMINATION:  Has a positive straight leg raise on the left side and absent Achilles reflex (this was present previously).  No weakness or sensory loss.              MEDICAL DECISION MAKING:  His neurologic examination is consistent with an S1 radiculopathy secondary to disc protrusion L4-L5 on the left side.             ASSESSMENT/DISPOSITION: I will repeat his lumbar MRI.  Should this provide clinical correlation I would recommend discectomy.  We will get this done and I will keep you informed.     The lumbar MRI performed shows disc protrusion toward the left at L4-L5 compressing the nerve root.  Recommended discectomy.  He would like to proceed.             Electronically signed by Linus Tavera MD at 1/21/2019  6:05 PM             Linus Tavera MD at 1/21/2019  6:03 PM          Jm Henry  1990  [id number]                          CHIEF COMPLAINT:  Left leg pain           MEDICAL HISTORY SINCE LAST ENCOUNTER:  This 28-year-old male had a clinical " diagnosis of disc protrusion at L4-L5 treated conservatively approximately 8 months ago.  He did well all did have recurrence in the change in his symptoms have gotten progressively worse.  He reports for reevaluation.  He has severe pain in his back radiating into his left leg.  Physical therapy has not been helpful.             Medical History   History reviewed. No pertinent past medical history.               Surgical History         Past Surgical History:   Procedure Laterality Date   • CHOLECYSTECTOMY       • KNEE SURGERY                     History reviewed. No pertinent family history.            Social History               Socioeconomic History   • Marital status:        Spouse name: Not on file   • Number of children: Not on file   • Years of education: Not on file   • Highest education level: Not on file   Social Needs   • Financial resource strain: Not on file   • Food insecurity - worry: Not on file   • Food insecurity - inability: Not on file   • Transportation needs - medical: Not on file   • Transportation needs - non-medical: Not on file   Occupational History   • Not on file   Tobacco Use   • Smoking status: Never Smoker   • Smokeless tobacco: Never Used   Substance and Sexual Activity   • Alcohol use: Yes   • Drug use: No   • Sexual activity: Defer   Other Topics Concern   • Not on file   Social History Narrative   • Not on file                 No Known Allergies            Current Outpatient Medications:   •  methocarbamol (ROBAXIN) 750 MG tablet, Take 1 tablet by mouth Every Night., Disp: 30 tablet, Rfl: 1  •  nabumetone (RELAFEN) 750 MG tablet, Take 1 tablet by mouth 2 (Two) Times a Day., Disp: 60 tablet, Rfl: 1  •  traMADol (ULTRAM) 50 MG tablet, Take 1 tablet by mouth Every 8 (Eight) Hours As Needed for Moderate Pain ., Disp: 45 tablet, Rfl: 0          Review of Systems   Constitutional: Positive for activity change and fatigue. Negative for appetite change, chills, diaphoresis, fever  and unexpected weight change.   HENT: Negative for congestion, dental problem, drooling, ear discharge, ear pain, facial swelling, hearing loss, mouth sores, nosebleeds, postnasal drip, rhinorrhea, sinus pressure, sneezing, sore throat, tinnitus, trouble swallowing and voice change.    Eyes: Negative for photophobia, pain, discharge, redness, itching and visual disturbance.   Respiratory: Negative for apnea, cough, choking, chest tightness, shortness of breath, wheezing and stridor.    Cardiovascular: Negative for chest pain, palpitations and leg swelling.   Gastrointestinal: Negative for abdominal distention, abdominal pain, anal bleeding, blood in stool, constipation, diarrhea, nausea, rectal pain and vomiting.   Endocrine: Negative for cold intolerance, heat intolerance, polydipsia, polyphagia and polyuria.   Genitourinary: Negative for decreased urine volume, difficulty urinating, dysuria, enuresis, flank pain, frequency, genital sores, hematuria and urgency.   Musculoskeletal: Positive for arthralgias, back pain and gait problem. Negative for joint swelling, myalgias, neck pain and neck stiffness.   Skin: Negative for color change, pallor, rash and wound.   Allergic/Immunologic: Negative for environmental allergies, food allergies and immunocompromised state.   Neurological: Positive for dizziness, weakness, light-headedness and numbness. Negative for tremors, seizures, syncope, facial asymmetry, speech difficulty and headaches.   Hematological: Negative for adenopathy. Does not bruise/bleed easily.   Psychiatric/Behavioral: Positive for decreased concentration. Negative for agitation, behavioral problems, confusion, dysphoric mood, hallucinations, self-injury, sleep disturbance and suicidal ideas. The patient is not nervous/anxious and is not hyperactive.                  Vitals       Vitals:     01/14/19 1543   BP: 112/72   BP Location: Right arm   Patient Position: Sitting   Temp: 98.5 °F (36.9 °C)  "  TempSrc: Temporal   Weight: 94.8 kg (209 lb)   Height: 188 cm (74\")                     EXAMINATION:  Has a positive straight leg raise on the left side and absent Achilles reflex (this was present previously).  No weakness or sensory loss.              MEDICAL DECISION MAKING:  His neurologic examination is consistent with an S1 radiculopathy secondary to disc protrusion L4-L5 on the left side.             ASSESSMENT/DISPOSITION: I will repeat his lumbar MRI.  Should this provide clinical correlation I would recommend discectomy.  We will get this done and I will keep you informed.     The lumbar MRI performed shows disc protrusion toward the left at L4-L5 compressing the nerve root.  Recommended discectomy.  He would like to proceed.            Electronically signed by Linus Tavera MD at 2019  6:05 PM       [address] [address]  Dept. Phone:  [phone]  Dept. Fax:   Date Ordered: 2019         Patient:  Pardeep Henry MRN:  [id number]   [address] [place] [address] :  1990  SSN:    Phone: [phone] Sex:  M     Weight: 95.6 kg (210 lb 12.2 oz)         Ht Readings from Last 1 Encounters:   19 188 cm (74.02\")         Commode Chair          (Order ID: [id number])    Diagnosis:  Impaired functional mobility, balance, gait, and endurance (Z74.09 [ICD-10-CM] V49.89 [ICD-9-CM])   Quantity:  1     Equipment:  Commode Chair w/ Detachable Arms  Length of Need (99 Months = Lifetime): 99 Months = Lifetime        Authorizing Provider's Phone: [phone]   Verbal Order Mode: Telephone with readback   Authorizing Provider: Linus Tavera MD  Authorizing Provider's NPI: [id number]     Order Entered By: Aby Nevarez RN 2019  9:37 AM     Electronically signed by: Linus Tavera MD 2019 10:12 AM          "

## 2019-01-24 NOTE — THERAPY DISCHARGE NOTE
Acute Care - Physical Therapy Initial Eval/Discharge  [facility]     Patient Name: Pardeep Henry  : 1990  MRN: [id number]  Today's Date: 2019   Onset of Illness/Injury or Date of Surgery: 19  Date of Referral to PT: 19  Referring Physician: MD Liang      Admit Date: 2019    Visit Dx:    ICD-10-CM ICD-9-CM   1. Impaired functional mobility, balance, gait, and endurance Z74.09 V49.89   2. Lumbar herniated disc M51.26 722.10     Patient Active Problem List   Diagnosis   • Herniated lumbar intervertebral disc   • Degenerative disc disease, lumbar   • Radicular syndrome of left leg   • Lumbar herniated disc     History reviewed. No pertinent past medical history.  Past Surgical History:   Procedure Laterality Date   • CHOLECYSTECTOMY     • KNEE SURGERY      x3   • LUMBAR DISCECTOMY Left 2019    Procedure: LUMBAR DISCECTOMY L4-5 LEFT;  Surgeon: Linus Tavera MD;  Location: [facility];  Service: Neurosurgery   • OTHER SURGICAL HISTORY      pyloric stenosis surgery as an infant           PT ASSESSMENT (last 12 hours)      Physical Therapy Evaluation     Row Name 19 0820          PT Evaluation Time/Intention    Subjective Information  complains of;pain denies leg pain  -LR     Document Type  evaluation;discharge evaluation/summary  -LR     Mode of Treatment  physical therapy;individual therapy  -LR     Patient Effort  excellent  -LR     Symptoms Noted During/After Treatment  none  -LR     Row Name 19 0820          General Information    Patient Profile Reviewed?  yes  -LR     Onset of Illness/Injury or Date of Surgery  19  -LR     Referring Physician  MD Liang  -LR     Patient Observations  alert;cooperative;agree to therapy  -LR     Patient/Family Observations  Wife present.   -LR     General Observations of Patient  Patient supine in bed upon arrival. IV, SCDs.   -LR     Prior Level of Function  min assist:;all household mobility;community  mobility;gait;transfer;bed mobility;ADL's;shopping;using stairs;independent:;driving;dependent:;home management;cooking;cleaning long distances limited by pain, no home management.   -LR     Equipment Currently Used at Home  none  -LR     Pertinent History of Current Functional Problem  Patient presents for surgical management of persistent and progressive low back pain that radiated down L LE, causing L LE weakness, and did not improve with conservative management.   -LR     Existing Precautions/Restrictions  fall;spinal;other (see comments)  (Significant)  no sitting  -LR     Limitations/Impairments  -- none  -LR     Equipment Issued to Patient  -- none  -LR     Equipment Ordered for Patient  -- none  -LR     Risks Reviewed  patient:;spouse/S.O.:;LOB;nausea/vomiting;dizziness;increased discomfort;lines disloged  -LR     Benefits Reviewed  patient:;spouse/S.O.:;improve function;increase independence;increase strength;increase balance;decrease pain;decrease risk of DVT;increase knowledge  -LR     Barriers to Rehab  previous functional deficit  -LR     Row Name 01/24/19 0820          Relationship/Environment    Primary Source of Support/Comfort  spouse available to assist at all times upon d/c home.   -LR     Lives With  spouse;child(oscar), dependent  -LR     Row Name 01/24/19 0820          Resource/Environmental Concerns    Current Living Arrangements  home/apartment/condo  -LR     Resource/Environmental Concerns  none  -LR     Transportation Concerns  car, none  -LR     Row Name 01/24/19 0820          Home Main Entrance    Number of Stairs, Main Entrance  two  -LR     Stair Railings, Main Entrance  none  -LR     Row Name 01/24/19 0820          Cognitive Assessment/Intervention- PT/OT    Orientation Status (Cognition)  oriented x 4  -LR     Follows Commands (Cognition)  WFL;follows one step commands;over 90% accuracy  -LR     Safety Deficit (Cognitive)  other (see comments) none  -LR     Row Name 01/24/19 0820           Safety Issues, Functional Mobility    Safety Issues Affecting Function (Mobility)  other (see comments) none  -LR     Row Name 01/24/19 0820          Bed Mobility Assessment/Treatment    Bed Mobility Assessment/Treatment  supine-sit  -LR     Supine-Sit [place] (Bed Mobility)  verbal cues;contact guard  -LR     Assistive Device (Bed Mobility)  head of bed elevated  -LR     Comment (Bed Mobility)  Verbal cues to flex knees and then roll hips and shoulders at same time onto R side. Verbal cues to then drop LEs off EOB and to push up from bed to raise trunk into sitting. C/o mild dizziness upon sitting up. Improved with rest.   -LR     Row Name 01/24/19 0820          Transfer Assessment/Treatment    Transfer Assessment/Treatment  sit-stand transfer;stand-sit transfer  -LR     Comment (Transfers)  Verbal cues to push up from bed to stand and to limit trunk flexion during t/f.   -LR     Sit-Stand [place] (Transfers)  verbal cues;stand by assist  -LR     Stand-Sit [place] (Transfers)  not tested Patient requested to remain standing at end of eval.   -LR     Row Name 01/24/19 0820          Sit-Stand Transfer    Assistive Device (Sit-Stand Transfers)  other (see comments) no AD  -LR     Row Name 01/24/19 0820          Gait/Stairs Assessment/Training    90708 - Gait Training Minutes   10  -LR     [place] Level (Gait)  verbal cues;stand by assist  -LR     Assistive Device (Gait)  other (see comments) no AD  -LR     Distance in Feet (Gait)  400  -LR     Pattern (Gait)  step-through  -LR     Deviations/Abnormal Patterns (Gait)  bilateral deviations;[place] decreased;gait speed decreased;stride length decreased;left sided deviations  -LR     Left Sided Gait Deviations  weight shift ability decreased  -LR     Negotiation (Stairs)  stairs independence;stairs assistive device;handrail location;number of steps;ascending technique;descending technique  -LR     [place] Level (Stairs)  verbal cues;stand by  assist  -LR     Assistive Device (Stairs)  other (see comments) no AD  -LR     Handrail Location (Stairs)  none  -LR     Number of Steps (Stairs)  6  -LR     Ascending Technique (Stairs)  step-over-step  -LR     Descending Technique (Stairs)  step-over-step  -LR     Stairs, Impairments  strength decreased;pain  -LR     Comment (Gait/Stairs)  Patient ambulated with step through gait pattern at slow pace. Good upright posture. Cues for increased step length and pace. Improved with cues for correction. No LOB or unsteadiness with gait. Gait limited by pain. Patient climbed steps reciprocally despite cues to take them one at a time. No LOB or unsteadiness with the stairs. Patient reported he liked to lead with his L LE up stairs first d/t h/o of multiple R Knee surgeries.   -LR     Row Name 01/24/19 0820          General ROM    RT Lower Ext  Comment  -LR     LT Lower Ext  Comment  -LR     Row Name 01/24/19 0820          Right Lower Ext    RT Lower Extremity Comments  R LE AROM WFL  -LR     Row Name 01/24/19 0820          Left Lower Ext    LT Lower Extremity Comments  L LE AROM WFL  -LR     Row Name 01/24/19 0820          MMT (Manual Muscle Testing)    Rt Lower Ext  Rt Hip WFL;Rt Knee WFL;Rt Ankle WFL  -LR     Lt Lower Ext  Comments  -LR     Row Name 01/24/19 0820          MMT Left Lower Ext    Lt Lower Extremity Comments   L LE functionally 4/5  -LR     Row Name 01/24/19 0820          Motor Assessment/Intervention    Additional Documentation  Therapeutic Exercise (Group);Therapeutic Exercise Interventions (Group)  -LR     Row Name 01/24/19 0820          Therapeutic Exercise    78482 - PT Therapeutic Exercise Minutes  13  -LR     Row Name 01/24/19 0820          Therapeutic Exercise    Lower Extremity (Therapeutic Exercise)  gluteal sets;heel slides, bilateral;quad sets, bilateral  -LR     Lower Extremity Range of Motion (Therapeutic Exercise)  hip internal/external rotation, bilateral;ankle dorsiflexion/plantar flexion,  bilateral  -LR     Core Strength (Therapeutic Exercise)  abdominal bracing;other (see comments) ab sets, BKFOs, arms overhead  -LR     Exercise Type (Therapeutic Exercise)  AROM (active range of motion);isometric contraction, static;isotonic contraction, concentric  -LR     Position (Therapeutic Exercise)  supine  -LR     Sets/Reps (Therapeutic Exercise)  x10 reps each  -LR     Comment (Therapeutic Exercise)  cues for technique; no assist required.   -LR     Row Name 01/24/19 0820          Sensory Assessment/Intervention    Sensory General Assessment  no sensation deficits identified;other (see comments) denies numbness/tingling;light touch equal and intact  -LR     Row Name 01/24/19 0820          Vision Assessment/Intervention    Visual Impairment/Limitations  WFL  -LR     Row Name 01/24/19 0820          Pain Assessment    Additional Documentation  Pain Scale: Numbers Pre/Post-Treatment (Group)  -LR     Row Name 01/24/19 0820          Pain Scale: Numbers Pre/Post-Treatment    Pain Scale: Numbers, Pretreatment  1/10  -LR     Pain Scale: Numbers, Post-Treatment  1/10  -LR     Pain Location - Orientation  lower  -LR     Pain Location  back  -LR     Pain Intervention(s)  Repositioned;Ambulation/increased activity  -LR     Row Name             Wound 01/23/19 1324 Left back incision    Wound - Properties Group Date first assessed: 01/23/19  -LW Time first assessed: 1324  -LW Side: Left  -LW Location: back  -LW Type: incision  -LW    Row Name 01/24/19 0820          Coping    Observed Emotional State  accepting;cooperative  -LR     Verbalized Emotional State  acceptance  -LR     Row Name 01/24/19 0820          Plan of Care Review    Plan of Care Reviewed With  patient;spouse  -LR     Row Name 01/24/19 0820          Physical Therapy Clinical Impression    Date of Referral to PT  01/23/19  -LR     PT Diagnosis (PT Clinical Impression)  herniated intervertebral disc L4-5 Left/ s/p excision of herniated disc L4-5 L  -LR      Prognosis (PT Clinical Impression)  good  -LR     Patient/Family Goals Statement (PT Clinical Impression)  go home  -LR     Criteria for Skilled Interventions Met (PT Clinical Impression)  yes;treatment indicated  -LR     Rehab Potential (PT Clinical Summary)  good, to achieve stated therapy goals  -LR     Care Plan Review (PT)  evaluation/treatment results reviewed;care plan/treatment goals reviewed;risks/benefits reviewed;current/potential barriers reviewed;patient/other agree to care plan  -LR     Care Plan Review, Other Participant (PT Clinical Impression)  spouse  -LR     Row Name 01/24/19 0820          Physical Therapy Goals    Bed Mobility Goal Selection (PT)  bed mobility, PT goal 1  -LR     Transfer Goal Selection (PT)  transfer, PT goal 1  -LR     Gait Training Goal Selection (PT)  gait training, PT goal 1  -LR     Stairs Goal Selection (PT)  stairs, PT goal 1  -LR     Additional Documentation  Stairs Goal Selection (PT) (Row)  -LR     Row Name 01/24/19 0820          Bed Mobility Goal 1 (PT)    Activity/Assistive Device (Bed Mobility Goal 1, PT)  supine to sit  -LR     [place] Level/Cues Needed (Bed Mobility Goal 1, PT)  contact guard assist  -LR     Time Frame (Bed Mobility Goal 1, PT)  long term goal (LTG);1 day  -LR     Progress/Outcomes (Bed Mobility Goal 1, PT)  goal met  -LR     Row Name 01/24/19 0820          Transfer Goal 1 (PT)    Activity/Assistive Device (Transfer Goal 1, PT)  sit-to-stand/stand-to-sit  -LR     [place] Level/Cues Needed (Transfer Goal 1, PT)  standby assist  -LR     Time Frame (Transfer Goal 1, PT)  long term goal (LTG);1 day  -LR     Progress/Outcome (Transfer Goal 1, PT)  goal met  -LR     Row Name 01/24/19 0820          Gait Training Goal 1 (PT)    Activity/Assistive Device (Gait Training Goal 1, PT)  gait (walking locomotion)  -LR     [place] Level (Gait Training Goal 1, PT)  standby assist  -LR     Distance (Gait Goal 1, PT)  400 feet  -LR     Time Frame  (Gait Training Goal 1, PT)  long term goal (LTG);1 day  -LR     Progress/Outcome (Gait Training Goal 1, PT)  goal met  -LR     Row Name 01/24/19 0820          Stairs Goal 1 (PT)    Activity/Assistive Device (Stairs Goal 1, PT)  ascending stairs;descending stairs;step-over step  -LR     [place] Level/Cues Needed (Stairs Goal 1, PT)  standby assist  -LR     Number of Stairs (Stairs Goal 1, PT)  2  -LR     Time Frame (Stairs Goal 1, PT)  long term goal (LTG);1 day  -LR     Progress/Outcome (Stairs Goal 1, PT)  goal met  -LR     Row Name 01/24/19 0820          Positioning and Restraints    Pre-Treatment Position  in bed  -LR     Post Treatment Position  other  -LR     Other Position  -- standing in room independently with wife present.   -LR     Row Name 01/24/19 0820          Physical Therapy Discharge Summary    Additional Documentation  Discharge Summary, PT Eval (Group)  -LR     Row Name 01/24/19 0820          Discharge Summary, PT Eval    Reason for Discharge (PT Discharge Summary)  patient discharged from this facility  -LR     Outcomes Achieved Upon Discharge (PT Discharge Summary)  able to achieve all goals within established timeline;evaluation only;discharge from facility occurred on same date as evaluation  -LR     Transfer to Another Level of Care or Facility (PT Discharge Summary)  patient will continue therapy goals following discharge  -LR     Row Name 01/24/19 0820          Living Environment    Home Accessibility  not wheelchair accessible;stairs to enter home;other (see comments) walk in shower  -LR       User Key  (r) = Recorded By, (t) = Taken By, (c) = Cosigned By    Initials Name Provider Type    LR Pamela Holcomb, PT Physical Therapist    Almaz Ellis RN Registered Nurse          Physical Therapy Education     Title: PT OT SLP Therapies (Done)     Topic: Physical Therapy (Done)     Point: Mobility training (Done)     Learning Progress Summary           Patient Acceptance,  E,D,H, VU,DU by LR at 1/24/2019  8:20 AM    Comment:  Issued and reviewed written/illustrated HEP and spinal precautions. Educated on correct log rolling technique, correct sit<->stand t/f technique, correct gait mechanics, correct stair training technique, and correct car t/f technique.   Significant Other Acceptance, E,D,H, VU,DU by LR at 1/24/2019  8:20 AM    Comment:  Issued and reviewed written/illustrated HEP and spinal precautions. Educated on correct log rolling technique, correct sit<->stand t/f technique, correct gait mechanics, correct stair training technique, and correct car t/f technique.                   Point: Home exercise program (Done)     Learning Progress Summary           Patient Acceptance, E,D,H, VU,DU by LR at 1/24/2019  8:20 AM    Comment:  Issued and reviewed written/illustrated HEP and spinal precautions. Educated on correct log rolling technique, correct sit<->stand t/f technique, correct gait mechanics, correct stair training technique, and correct car t/f technique.   Significant Other Acceptance, E,D,H, VU,DU by LR at 1/24/2019  8:20 AM    Comment:  Issued and reviewed written/illustrated HEP and spinal precautions. Educated on correct log rolling technique, correct sit<->stand t/f technique, correct gait mechanics, correct stair training technique, and correct car t/f technique.                   Point: Body mechanics (Done)     Learning Progress Summary           Patient Acceptance, E,D,H, VU,DU by LR at 1/24/2019  8:20 AM    Comment:  Issued and reviewed written/illustrated HEP and spinal precautions. Educated on correct log rolling technique, correct sit<->stand t/f technique, correct gait mechanics, correct stair training technique, and correct car t/f technique.   Significant Other Acceptance, E,D,H, VU,DU by LR at 1/24/2019  8:20 AM    Comment:  Issued and reviewed written/illustrated HEP and spinal precautions. Educated on correct log rolling technique, correct sit<->stand  t/f technique, correct gait mechanics, correct stair training technique, and correct car t/f technique.                   Point: Precautions (Done)     Learning Progress Summary           Patient Acceptance, E,D,H, KANWAL,DU by LR at 1/24/2019  8:20 AM    Comment:  Issued and reviewed written/illustrated HEP and spinal precautions. Educated on correct log rolling technique, correct sit<->stand t/f technique, correct gait mechanics, correct stair training technique, and correct car t/f technique.   Significant Other Acceptance, E,D,H, KANWAL,VANIA by LR at 1/24/2019  8:20 AM    Comment:  Issued and reviewed written/illustrated HEP and spinal precautions. Educated on correct log rolling technique, correct sit<->stand t/f technique, correct gait mechanics, correct stair training technique, and correct car t/f technique.                               User Key     Initials Effective Dates Name Provider Type Discipline    LR 06/19/15 -  Pamela Holcomb, PT Physical Therapist PT                PT Recommendation and Plan  Anticipated Discharge Disposition (PT): home with assist  Planned Therapy Interventions (PT Eval): balance training, bed mobility training, gait training, home exercise program, patient/family education, stair training, strengthening, transfer training  Therapy Frequency (PT Clinical Impression): daily  Outcome Summary/Treatment Plan (PT)  Anticipated Equipment Needs at Discharge (PT): (none)  Anticipated Discharge Disposition (PT): home with assist  Reason for Discharge (PT Discharge Summary): patient discharged from this facility  Plan of Care Reviewed With: patient, spouse  Progress: improving  Outcome Summary: Patient ambulate 400 feet without AD with no difficulty, limited by pain. Denied leg pain throughout eval. Climbed 6 steps reciprocally with no handrails with no difficulty. Patient has been d/c home with family today.     Outcome Measures     Row Name 01/24/19 0820             How much help from  another person do you currently need...    Turning from your back to your side while in flat bed without using bedrails?  3  -LR      Moving from lying on back to sitting on the side of a flat bed without bedrails?  3  -LR      Moving to and from a bed to a chair (including a wheelchair)?  3  -LR      Standing up from a chair using your arms (e.g., wheelchair, bedside chair)?  3  -LR      Climbing 3-5 steps with a railing?  3  -LR      To walk in hospital room?  3  -LR      AM-PAC 6 Clicks Score  18  -LR         Functional Assessment    Outcome Measure Options  AM-PAC 6 Clicks Basic Mobility (PT)  -LR        User Key  (r) = Recorded By, (t) = Taken By, (c) = Cosigned By    Initials Name Provider Type    Pamela Aparicio, PT Physical Therapist           Time Calculation:   PT Charges     Row Name 01/24/19 0820             Time Calculation    Start Time  0820  -LR      PT Received On  01/24/19  -LR      PT Goal Re-Cert Due Date  02/03/19  -LR         Time Calculation- PT    Total Timed Code Minutes- PT  23 minute(s)  -LR         Timed Charges    43408 - PT Therapeutic Exercise Minutes  13  -LR      49168 - Gait Training Minutes   10  -LR        User Key  (r) = Recorded By, (t) = Taken By, (c) = Cosigned By    Initials Name Provider Type    Pamela Aparicio, PT Physical Therapist        Therapy Suggested Charges     Code   Minutes Charges    66828 (CPT®) Hc Pt Neuromusc Re Education Ea 15 Min      78411 (CPT®) Hc Pt Ther Proc Ea 15 Min 13 1    61147 (CPT®) Hc Gait Training Ea 15 Min 10 1    04561 (CPT®) Hc Pt Therapeutic Act Ea 15 Min      20708 (CPT®) Hc Pt Manual Therapy Ea 15 Min      36867 (CPT®) Hc Pt Iontophoresis Ea 15 Min      02221 (CPT®) Hc Pt Elec Stim Ea-Per 15 Min      42122 (CPT®) Hc Pt Ultrasound Ea 15 Min      31749 (CPT®) Hc Pt Self Care/Mgmt/Train Ea 15 Min      08508 (CPT®) Hc Pt Prosthetic (S) Train Initial Encounter, Each 15 Min      79458 (CPT®) Hc Pt Orthotic(S)/Prosthetic(S)  Encounter, Each 15 Min      86132 (CPT®)  Orthotic(S) Mgmt/Train Initial Encounter, Each 15min      Total  23 2        Therapy Charges for Today     Code Description Service Date Service Provider Modifiers Qty    88227628841 HC PT THER PROC EA 15 MIN 1/24/2019 Pamela Holcomb, PT GP 1    67376334403 HC GAIT TRAINING EA 15 MIN 1/24/2019 Pamela Holcomb, PT GP 1    31160989932 HC PT EVAL LOW COMPLEXITY 2 1/24/2019 Pamela Holcomb, PT GP 1          PT G-Codes  Outcome Measure Options: AM-PAC 6 Clicks Basic Mobility (PT)  AM-PAC 6 Clicks Score: 18    PT Discharge Summary  Anticipated Discharge Disposition (PT): home with assist  Reason for Discharge: Discharge from facility  Outcomes Achieved: Patient able to partially acheive established goals  Discharge Destination: Home with assist    Pamela Holcomb, PT  1/24/2019

## 2019-01-24 NOTE — PROGRESS NOTES
Discharge Planning Assessment  [facility]     Patient Name: Pardeep Henry  MRN: [id number]  Today's Date: 1/24/2019    Admit Date: 1/23/2019    Discharge Needs Assessment     Row Name 01/24/19 1022       Living Environment    Lives With  spouse;child(oscar), dependent    Current Living Arrangements  home/apartment/condo    Primary Care Provided by  self    Provides Primary Care For  no one    Family Caregiver if Needed  spouse    Quality of Family Relationships  helpful;involved;supportive    Able to Return to Prior Arrangements  yes       Resource/Environmental Concerns    Resource/Environmental Concerns  none    Transportation Concerns  car, none       Transition Planning    Patient/Family Anticipates Transition to  home with family    Patient/Family Anticipated Services at Transition  none    Transportation Anticipated  family or friend will provide       Discharge Needs Assessment    Equipment Currently Used at Home  none    Anticipated Changes Related to Illness  none    Equipment Needed After Discharge  commode        Discharge Plan     Row Name 01/24/19 1025       Plan    Plan  IDP    Patient/Family in Agreement with Plan  yes    Plan Comments  Pt lives w wife in [facility]. Wife will transport home at d/c. Pt uses no DME, HH, or PT/OT. Pt has requested a [facility]. DME list discussed w pt and they chose [facility]. Ref given to Ursula who will deliver to pt room prior to d/c.  Pt was not using HH or PT/OT prior to admission.  Pt PCP is Dr. MAURIZIO Paz and his insurance covers his medications. Pt goal is to return home at d/c w wife. No other d/c needs at this time. CM will continue to follow.    Row Name 01/24/19 0940       Plan    Final Discharge Disposition Code  01 - home or self-care        Destination      No service coordination in this encounter.      Durable Medical Equipment      Service Provider Request Status Selected Services Address Phone Number Fax Number    [facility] Pending - No  Request Sent N/A [address] [id number] [phone]      Dialysis/Infusion      No service coordination in this encounter.      Home Medical Care      No service coordination in this encounter.      Community Resources      No service coordination in this encounter.        Expected Discharge Date and Time     Expected Discharge Date Expected Discharge Time    Jan 24, 2019         Demographic Summary     Row Name 01/24/19 1022       General Information    Admission Type  observation    Arrived From  operating room    Referral Source  admission list    Reason for Consult  discharge planning    Preferred Language  English       Contact Information    Permission Granted to Share Info With  ;family/designee    Contact Information Comments  Kiki (spouse/EC)  [phone]        Functional Status     Row Name 01/24/19 1022       Functional Status    Usual Activity Tolerance  good       Functional Status, IADL    Medications  independent    Meal Preparation  independent    Housekeeping  independent    Shopping  independent        Psychosocial    No documentation.       Abuse/Neglect    No documentation.       Legal    No documentation.       Substance Abuse    No documentation.       Patient Forms    No documentation.           Aby Nevarez RN

## 2019-01-24 NOTE — PLAN OF CARE
Problem: Patient Care Overview  Goal: Plan of Care Review  Outcome: Ongoing (interventions implemented as appropriate)   01/24/19 0820   Coping/Psychosocial   Plan of Care Reviewed With patient;spouse   Plan of Care Review   Progress improving   OTHER   Outcome Summary Patient ambulate 400 feet without AD with no difficulty, limited by pain. Denied leg pain throughout eval. Climbed 6 steps reciprocally with no handrails with no difficulty. Patient has been d/c home with family today.

## 2019-01-24 NOTE — PLAN OF CARE
Problem: Patient Care Overview  Goal: Plan of Care Review  Outcome: Ongoing (interventions implemented as appropriate)   01/24/19 0425   Coping/Psychosocial   Plan of Care Reviewed With patient   Plan of Care Review   Progress improving   OTHER   Outcome Summary Patients pain was well controlled during the night with PRN pain medications. Dressing was clean dry and intact. Patient ambulated with x 1 assist. Patient is D/C this morning.        Problem: Pain, Acute (Adult)  Goal: Identify Related Risk Factors and Signs and Symptoms  Outcome: Ongoing (interventions implemented as appropriate)   01/24/19 0425   Pain, Acute (Adult)   Related Risk Factors (Acute Pain) surgery;positioning   Signs and Symptoms (Acute Pain) fatigue/weakness;verbalization of pain descriptors     Goal: Acceptable Pain Control/Comfort Level  Outcome: Ongoing (interventions implemented as appropriate)

## 2019-01-24 NOTE — PROGRESS NOTES
Continued Stay Note  [facility]     Patient Name: Pardeep Henry  MRN: [id number]  Today's Date: 1/24/2019    Admit Date: 1/23/2019    Discharge Plan     Row Name 01/24/19 1451       Plan    Plan  DME update    Patient/Family in Agreement with Plan  other (see comments)    Plan Comments  CM recieved a call from Ursula Yoder who stated the pt had left when she went to [facility] the [facility] to his room. She stated that her company does not deliver these to [facility]. CM contacted [employer] and spoke shoaib Whitfield who does deliver in [place].  She accepted the referral and CM faxed the order and info to [phone] per request. They will contact the patient and deliver this afternoon. Fax recieval confirmed. No other changes at this time.        Discharge Codes    No documentation.       Expected Discharge Date and Time     Expected Discharge Date Expected Discharge Time    Jan 24, 2019             Aby Nevarez RN

## 2019-01-24 NOTE — PLAN OF CARE
Problem: Patient Care Overview  Goal: Plan of Care Review  Outcome: Ongoing (interventions implemented as appropriate)   01/24/19 1205   Coping/Psychosocial   Plan of Care Reviewed With patient   Plan of Care Review   Progress improving   OTHER   Outcome Summary OT eval complete. Pt completes log roll with supervision and sit to stand with SBA. OT issued AE and educated pt on use for completion of LBB, LBD, and toileting. Pt dof/don socks and shoes with Jordy and use of reacher, sock aid, and shoe horn. Recommend d/c home with assist.

## 2019-01-24 NOTE — THERAPY EVALUATION
Acute Care - Occupational Therapy Initial Evaluation  [facility]     Patient Name: Pardeep Henry  : 1990  MRN: [id number]  Today's Date: 2019  Onset of Illness/Injury or Date of Surgery: 18  Date of Referral to OT: 18  Referring Physician: MD Liang     Admit Date: 2019       ICD-10-CM ICD-9-CM   1. Impaired functional mobility, balance, gait, and endurance Z74.09 V49.89   2. Lumbar herniated disc M51.26 722.10   3. Impaired mobility and ADLs Z74.09 799.89     Patient Active Problem List   Diagnosis   • Herniated lumbar intervertebral disc   • Degenerative disc disease, lumbar   • Radicular syndrome of left leg   • Lumbar herniated disc     History reviewed. No pertinent past medical history.  Past Surgical History:   Procedure Laterality Date   • CHOLECYSTECTOMY     • KNEE SURGERY      x3   • LUMBAR DISCECTOMY Left 2019    Procedure: LUMBAR DISCECTOMY L4-5 LEFT;  Surgeon: Linus Tavera MD;  [facility]: [facility];  Service: Neurosurgery   • OTHER SURGICAL HISTORY      pyloric stenosis surgery as an infant           OT ASSESSMENT FLOWSHEET (last 72 hours)      Occupational Therapy Evaluation     Row Name 19 0930                   OT Evaluation Time/Intention    Subjective Information  complains of;pain  -HK        Document Type  evaluation  -HK        Mode of Treatment  individual therapy;occupational therapy  -HK        Patient Effort  excellent  -HK        Symptoms Noted During/After Treatment  none  -HK           General Information    Patient Profile Reviewed?  yes  -HK        Onset of Illness/Injury or Date of Surgery  18  -HK        Referring Physician  MD Liang   -HK        Patient Observations  cooperative;alert;agree to therapy  -HK        Patient/Family Observations  Wife at bedside.   -HK        General Observations of Patient  Pt received supine in bed.   -HK        Prior Level of Function  min assist:;all household mobility;community  mobility;gait;transfer;bed mobility;ADL's;shopping;using stairs;independent:;driving;dependent:;home management;cooking;cleaning no home management; limited by pain   -HK        Equipment Currently Used at Home  none  -HK        Pertinent History of Current Functional Problem  Pt is a 28 YOM who presents to [facility] for surgical management of intractable back pain and dysfunction that failed to improve with conservative management. Pt is POD #1 s/p lumbar discectomy L4-L5 left.   -HK        Existing Precautions/Restrictions  fall;spinal;other (see comments) no sitting   -HK        Risks Reviewed  patient:;LOB;dizziness;nausea/vomiting;change in vital signs;increased discomfort;increased drainage;lines disloged  -HK        Benefits Reviewed  patient:;increase independence;improve function;increase strength;increase balance;decrease pain;decrease risk of DVT;improve skin integrity;increase knowledge  -HK        Barriers to Rehab  previous functional deficit  -HK           Relationship/Environment    Primary Source of Support/Comfort  spouse  -HK        Lives With  spouse;child(oscar), dependent  -HK           Resource/Environmental Concerns    Current Living Arrangements  home/apartment/condo  -HK           Home Main Entrance    Number of Stairs, Main Entrance  two  -HK        Stair Railings, Main Entrance  none  -HK           Cognitive Assessment/Interventions    Additional Documentation  Cognitive Assessment/Intervention (Group)  -HK           Cognitive Assessment/Intervention- PT/OT    Affect/Mental Status (Cognitive)  WNL  -HK        Orientation Status (Cognition)  oriented x 4  -HK        Follows Commands (Cognition)  WFL;follows one step commands;75-90% accuracy  -HK        Cognitive Function (Cognitive)  WNL  -HK        Personal Safety Interventions  fall prevention program maintained;gait belt;nonskid shoes/slippers when out of bed  -HK           Bed Mobility Assessment/Treatment    Bed Mobility Assessment/Treatment   sidelying-sit;sit-sidelying  -HK        Sidelying-Sit [place] (Bed Mobility)  independent  -HK        Sit-Sidelying [place] (Bed Mobility)  independent  -HK        Comment (Bed Mobility)  Pt educated on log roll technique and pt completed with supervision.   -HK           Functional Mobility    Functional Mobility- Ind. Level  not tested  -HK           Transfer Assessment/Treatment    Transfer Assessment/Treatment  sit-stand transfer;stand-sit transfer  -HK        Comment (Transfers)  Verbal cues for safety. Pt educated on spinal precautions and how to maintain during transfers and ADLS. Pt and spouse requested booklet on sex and back pain. OT issued.    -HK           Sit-Stand Transfer    Sit-Stand [place] (Transfers)  stand by assist  -HK        Assistive Device (Sit-Stand Transfers)  -- none  -HK           Stand-Sit Transfer    Stand-Sit [place] (Transfers)  stand by assist  -HK        Assistive Device (Stand-Sit Transfers)  -- none  -HK           ADL Assessment/Intervention    BADL Assessment/Intervention  lower body dressing;bathing;toileting  -HK           Bathing Assessment/Intervention    Comment (Bathing)  Pt educated on completion of LBB with use of LH sponge to maintain spinal precautions.   -HK           Lower Body Dressing Assessment/Training    Lower Body Dressing [place] Level  doff;don;socks;shoes/slippers;minimum assist (75% patient effort);verbal cues  -HK        Assistive Devices (Lower Body Dressing)  reacher;sock-aid;long-handled shoe horn  -HK        Lower Body Dressing Position  unsupported sitting  -HK        Comment (Lower Body Dressing)  OT issued and educated pt on use of AE for completion of all LBD. Pt dof/don socks and shoes with Jordy and use of sock aid, reacher, and shoe horn.   -HK           Toileting Assessment/Training    Comment (Toileting)  OT issed toilet tongs and educated pt on use. Pt educated on techniques for maintaining spinal precautions while  compelting erin care.   -HK           BADL Safety/Performance    Impairments, BADL Safety/Performance  balance;pain;strength  -HK           General ROM    RT Upper Ext  Rt Shoulder Flexion  -HK        LT Upper Ext  Lt Shoulder Flexion  -HK        GENERAL ROM COMMENTS  WFL for eval  -HK           Right Upper Ext    Rt Shoulder Flexion AROM  WFL  -HK           Left Upper Ext    Lt Shoulder Flexion AROM  WFL  -HK           MMT (Manual Muscle Testing)    Rt Upper Ext  Rt Shoulder WFL  -HK        Lt Upper Ext  Lt Shoulder WFL  -HK        General MMT Comments  WFL for eval  -HK           Motor Assessment/Interventions    Additional Documentation  Balance (Group)  -HK           Balance    Balance  static sitting balance;static standing balance;dynamic sitting balance;dynamic standing balance  -HK           Static Sitting Balance    Level of [place] (Unsupported Sitting, Static Balance)  independent  -HK        Sitting Position (Unsupported Sitting, Static Balance)  sitting on edge of bed  -HK        Time Able to Maintain Position (Unsupported Sitting, Static Balance)  2 to 3 minutes  -HK           Dynamic Sitting Balance    Level of [place], Reaches Outside Midline (Sitting, Dynamic Balance)  independent  -HK        Sitting Position, Reaches Outside Midline (Sitting, Dynamic Balance)  sitting on edge of bed  -HK        Comment, Reaches Outside Midline (Sitting, Dynamic Balance)  completing LBD with use of AE   -HK           Static Standing Balance    Level of [place] (Supported Standing, Static Balance)  independent  -HK        Time Able to Maintain Position (Supported Standing, Static Balance)  1 to 2 minutes  -HK        Assistive Device Utilized (Supported Standing, Static Balance)  other (see comments) none  -HK           Dynamic Standing Balance    Level of [place], Reaches Outside Midline (Standing, Dynamic Balance)  independent  -HK        Time Able to Maintain Position, Reaches Outside Midline  (Standing, Dynamic Balance)  1 to 2 minutes  -HK        Assistive Device Utilized (Supported Standing, Dynamic Balance)  other (see comments) none  -HK           Sensory Assessment/Intervention    Sensory General Assessment  no sensation deficits identified  -HK           Vision Assessment/Intervention    Visual Impairment/Limitations  WFL  -HK           Positioning and Restraints    Pre-Treatment Position  in bed  -HK        Post Treatment Position  bed  -HK        In Bed  notified nsg;supine;call light within reach;encouraged to call for assist;with family/caregiver  -HK           Pain Assessment    Additional Documentation  Pain Scale: Numbers Pre/Post-Treatment (Group)  -HK           Pain Scale: Numbers Pre/Post-Treatment    Pain Scale: Numbers, Pretreatment  1/10  -HK        Pain Scale: Numbers, Post-Treatment  1/10  -HK        Pain Location - Side  Bilateral  -HK        Pain Location - Orientation  lower  -HK        Pain Location  back  -HK           [REMOVED] Wound 01/23/19 1324 Left back incision    Wound - Properties Group Date first assessed: 01/23/19  -LW Time first assessed: 1324  -LW Side: Left  -LW Location: back  -LW Type: incision  -LW Resolution Date: 01/24/19  -SJ Resolution Time: 1119  -SJ       Plan of Care Review    Plan of Care Reviewed With  patient;spouse  -HK           Clinical Impression (OT)    Date of Referral to OT  01/23/18  -HK        OT Diagnosis  Decreased independence with ADLS and Mobility   -HK        Patient/Family Goals Statement (OT Eval)  Pt would like to improve and return home.   -HK        Criteria for Skilled Therapeutic Interventions Met (OT Eval)  yes;treatment indicated  -HK        Rehab Potential (OT Eval)  good, to achieve stated therapy goals  -HK        Therapy Frequency (OT Eval)  daily  -HK        Care Plan Review (OT)  evaluation/treatment results reviewed;care plan/treatment goals reviewed;risks/benefits reviewed;patient/other agree to care plan  -HK        Care  Plan Review, Other Participant (OT Eval)  spouse  -HK        Anticipated Discharge Disposition (OT)  home with assist  -HK           Vital Signs    Pre Systolic BP Rehab  -- RN cleared for tx  -HK        Pre Patient Position  Supine  -HK        Intra Patient Position  Standing  -HK        Post Patient Position  Supine  -HK           OT Goals    Bed Mobility Goal Selection (OT)  bed mobility, OT goal 1  -HK        Transfer Goal Selection (OT)  transfer, OT goal 1  -HK        Dressing Goal Selection (OT)  dressing, OT goal 1  -HK        Toileting Goal Selection (OT)  toileting, OT goal 1  -HK           Bed Mobility Goal 1 (OT)    Activity/Assistive Device (Bed Mobility Goal 1, OT)  other (see comments) log roll technique  -HK        [place] Level/Cues Needed (Bed Mobility Goal 1, OT)  independent  -HK        Time Frame (Bed Mobility Goal 1, OT)  5 days  -HK        Progress/Outcomes (Bed Mobility Goal 1, OT)  goal ongoing  -HK           Transfer Goal 1 (OT)    Activity/Assistive Device (Transfer Goal 1, OT)  sit-to-stand/stand-to-sit;toilet  -HK        [place] Level/Cues Needed (Transfer Goal 1, OT)  independent  -HK        Time Frame (Transfer Goal 1, OT)  5 days  -HK        Progress/Outcome (Transfer Goal 1, OT)  goal ongoing  -HK           Dressing Goal 1 (OT)    Activity/Assistive Device (Dressing Goal 1, OT)  lower body dressing  -HK        [place]/Cues Needed (Dressing Goal 1, OT)  conditional independence  -HK        Time Frame (Dressing Goal 1, OT)  5 days  -HK        Progress/Outcome (Dressing Goal 1, OT)  goal ongoing  -HK           Toileting Goal 1 (OT)    Activity/Device (Toileting Goal 1, OT)  toileting skills, all;toilet paper aid  -HK        [place] Level/Cues Needed (Toileting Goal 1, OT)  conditional independence  -HK        Time Frame (Toileting Goal 1, OT)  5 days  -HK        Progress/Outcome (Toileting Goal 1, OT)  goal ongoing  -HK           Living Environment    Home  Accessibility  not wheelchair accessible;stairs to enter home;other (see comments) walk in shower   -          User Key  (r) = Recorded By, (t) = Taken By, (c) = Cosigned By    Initials Name Effective Dates    Almaz Ellis RN 06/16/16 -     Lissy Miller RN 06/16/16 -     HK Ashlee Romeo, OT 03/07/18 -          Occupational Therapy Education     Title: PT OT SLP Therapies (In Progress)     Topic: Occupational Therapy (In Progress)     Point: ADL training (Done)     Description: Instruct learner(s) on proper safety adaptation and remediation techniques during self care or transfers.   Instruct in proper use of assistive devices.    Learning Progress Summary           Patient Acceptance, E,TB,D, VU,DU by  at 1/24/2019 12:04 PM    Comment:  Pt educated on ADL retraining with use of AE for completion of LBB, LBD, and toileting. Educated on safety precautions and appropriate body mechanics to maintain spinal precautions. OT issued booklet on sex and back pain.                   Point: Precautions (Done)     Description: Instruct learner(s) on prescribed precautions during self-care and functional transfers.    Learning Progress Summary           Patient Acceptance, E,TB,D, VU,DU by  at 1/24/2019 12:04 PM    Comment:  Pt educated on ADL retraining with use of AE for completion of LBB, LBD, and toileting. Educated on safety precautions and appropriate body mechanics to maintain spinal precautions. OT issued booklet on sex and back pain.                   Point: Body mechanics (Done)     Description: Instruct learner(s) on proper positioning and spine alignment during self-care, functional mobility activities and/or exercises.    Learning Progress Summary           Patient Acceptance, E,TB,D, VU,DU by  at 1/24/2019 12:04 PM    Comment:  Pt educated on ADL retraining with use of AE for completion of LBB, LBD, and toileting. Educated on safety precautions and appropriate body mechanics to maintain  spinal precautions. OT issued booklet on sex and back pain.                               User Key     Initials Effective Dates Name Provider Type Discipline     03/07/18 -  Ashlee Romeo, OT Occupational Therapist OT                  OT Recommendation and Plan  Outcome Summary/Treatment Plan (OT)  Anticipated Discharge Disposition (OT): home with assist  Therapy Frequency (OT Eval): daily  Plan of Care Review  Plan of Care Reviewed With: patient  Plan of Care Reviewed With: patient  Outcome Summary: OT eval complete. Pt completes log roll with supervision and sit to stand with SBA. OT issued AE and educated pt on use for completion of LBB, LBD, and toileting. Pt dof/don socks and shoes with Jordy and use of reacher, sock aid, and shoe horn. Recommend d/c home with assist.     Outcome Measures     Row Name 01/24/19 0930 01/24/19 0820          How much help from another person do you currently need...    Turning from your back to your side while in flat bed without using bedrails?  --  3  -LR     Moving from lying on back to sitting on the side of a flat bed without bedrails?  --  3  -LR     Moving to and from a bed to a chair (including a wheelchair)?  --  3  -LR     Standing up from a chair using your arms (e.g., wheelchair, bedside chair)?  --  3  -LR     Climbing 3-5 steps with a railing?  --  3  -LR     To walk in hospital room?  --  3  -LR     AM-PAC 6 Clicks Score  --  18  -LR        How much help from another is currently needed...    Putting on and taking off regular lower body clothing?  3  -HK  --     Bathing (including washing, rinsing, and drying)  3  -HK  --     Toileting (which includes using toilet bed pan or urinal)  3  -HK  --     Putting on and taking off regular upper body clothing  4  -HK  --     Taking care of personal grooming (such as brushing teeth)  4  -HK  --     Eating meals  4  -HK  --     Score  21  -HK  --        Functional Assessment    Outcome Measure Options  AM-PAC 6 Clicks Daily  Activity (OT)  -  AM-PAC 6 Clicks Basic Mobility (PT)  -LR       User Key  (r) = Recorded By, (t) = Taken By, (c) = Cosigned By    Initials Name Provider Type    Pamela Aparicio, PT Physical Therapist    Ashlee Stewart, OT Occupational Therapist          Time Calculation:   Time Calculation- OT     Row Name 01/24/19 0930 01/24/19 0820          Time Calculation- OT    OT Start Time  0930 -  --     OT Received On  01/24/19 -  --     OT Goal Re-Cert Due Date  02/03/19  -  --        Timed Charges    49413 - Gait Training Minutes   --  10  -LR       User Key  (r) = Recorded By, (t) = Taken By, (c) = Cosigned By    Initials Name Provider Type    Pamela Aparicio, PT Physical Therapist    Ashlee Stewart, OT Occupational Therapist        Therapy Suggested Charges     Code   Minutes Charges    None           Therapy Charges for Today     Code Description Service Date Service Provider Modifiers Qty    [id number]  OT EVAL LOW COMPLEXITY 4 1/24/2019 Ashlee Romeo, OT GO 1               Ashlee Romeo OT  1/24/2019

## 2019-01-25 NOTE — THERAPY DISCHARGE NOTE
Acute Care - Occupational Therapy Discharge Summary  [facility]     Patient Name: Pardeep Henry  : 1990  MRN: [id number]    Today's Date: 2019  Onset of Illness/Injury or Date of Surgery: 18    Date of Referral to OT: 18  Referring Physician: MD Liang       Admit Date: 2019        OT Recommendation and Plan    Visit Dx:    ICD-10-CM ICD-9-CM   1. Impaired functional mobility, balance, gait, and endurance Z74.09 V49.89   2. Lumbar herniated disc M51.26 722.10   3. Impaired mobility and ADLs Z74.09 799.89               Rehab Goal Summary     Row Name 19 1422             Bed Mobility Goal 1 (OT)    Progress/Outcomes (Bed Mobility Goal 1, OT)  goal not met;discharged from facility  -ADRIANNA         Transfer Goal 1 (OT)    Progress/Outcome (Transfer Goal 1, OT)  goal not met;discharged from facility  -ADRIANNA         Dressing Goal 1 (OT)    Progress/Outcome (Dressing Goal 1, OT)  goal not met;discharged from facility  -ADRIANNA         Toileting Goal 1 (OT)    Progress/Outcome (Toileting Goal 1, OT)  goal not met;discharged from facility  -ADRIANNA        User Key  (r) = Recorded By, (t) = Taken By, (c) = Cosigned By    Initials Name Provider Type Discipline    Sheila Hoover, OT Occupational Therapist OT          Outcome Measures     Row Name 19 0930 19 0820          How much help from another person do you currently need...    Turning from your back to your side while in flat bed without using bedrails?  --  3  -LR     Moving from lying on back to sitting on the side of a flat bed without bedrails?  --  3  -LR     Moving to and from a bed to a chair (including a wheelchair)?  --  3  -LR     Standing up from a chair using your arms (e.g., wheelchair, bedside chair)?  --  3  -LR     Climbing 3-5 steps with a railing?  --  3  -LR     To walk in hospital room?  --  3  -LR     AM-PAC 6 Clicks Score  --  18  -LR        How much help from another is currently needed...    Putting on and  taking off regular lower body clothing?  3  -HK  --     Bathing (including washing, rinsing, and drying)  3  -HK  --     Toileting (which includes using toilet bed pan or urinal)  3  -HK  --     Putting on and taking off regular upper body clothing  4  -HK  --     Taking care of personal grooming (such as brushing teeth)  4  -HK  --     Eating meals  4  -HK  --     Score  21  -HK  --        Functional Assessment    Outcome Measure Options  AM-PAC 6 Clicks Daily Activity (OT)  -  AM-PAC 6 Clicks Basic Mobility (PT)  -       User Key  (r) = Recorded By, (t) = Taken By, (c) = Cosigned By    Initials Name Provider Type    LR Pamela Holcomb, PT Physical Therapist    Ashlee Stewart, OT Occupational Therapist          Therapy Suggested Charges     Code   Minutes Charges    None                 OT Discharge Summary  Reason for Discharge: Discharge from facility  Outcomes Achieved: Discharge from facility occurred on same date as evluation  Discharge Destination: Home with assist      Sheila Varela OT  1/25/2019

## 2019-02-26 ENCOUNTER — OFFICE VISIT (OUTPATIENT)
Dept: NEUROSURGERY | Facility: CLINIC | Age: 29
End: 2019-02-26

## 2019-02-26 VITALS
TEMPERATURE: 97.5 F | SYSTOLIC BLOOD PRESSURE: 112 MMHG | DIASTOLIC BLOOD PRESSURE: 80 MMHG | WEIGHT: 214.6 LBS | HEIGHT: 74 IN | BODY MASS INDEX: 27.54 KG/M2

## 2019-02-26 DIAGNOSIS — M51.36 DEGENERATIVE DISC DISEASE, LUMBAR: Primary | ICD-10-CM

## 2019-02-26 PROBLEM — M54.10 RADICULAR SYNDROME OF LEFT LEG: Status: RESOLVED | Noted: 2019-01-14 | Resolved: 2019-02-26

## 2019-02-26 PROCEDURE — 99024 POSTOP FOLLOW-UP VISIT: CPT | Performed by: PHYSICIAN ASSISTANT

## 2019-02-26 NOTE — PROGRESS NOTES
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 He is doing well, has very lttle discomfort in back. Only mild leg discomfort with increased activities. He does well with resting. Bladder is normal. No leg weakness but he feels he is not as strong in his legs.    No Known Allergies      Current Outpatient Medications:   •  HYDROcodone-acetaminophen (NORCO) 7.5-325 MG per tablet, Take 1 tablet by mouth Every 6 (Six) Hours As Needed for Moderate Pain ., Disp: 45 tablet, Rfl: 0  •  traMADol (ULTRAM) 50 MG tablet, Take 1 tablet by mouth Every 4 (Four) Hours As Needed for Moderate Pain ., Disp: 45 tablet, Rfl: 1    Review of Systems   Constitutional: Negative for activity change, appetite change, chills, diaphoresis, fatigue, fever and unexpected weight change.   HENT: Negative for congestion, dental problem, drooling, ear discharge, ear pain, facial swelling, hearing loss, mouth sores, nosebleeds, postnasal drip, rhinorrhea, sinus pressure, sneezing, sore throat, tinnitus, trouble swallowing and voice change.    Eyes: Negative for photophobia, pain, discharge, redness, itching and visual disturbance.   Respiratory: Negative for apnea, cough, choking, chest tightness, shortness of breath, wheezing and stridor.    Cardiovascular: Negative for chest pain, palpitations and leg swelling.   Gastrointestinal: Negative for abdominal distention, abdominal pain, anal bleeding, blood in stool, constipation, diarrhea, nausea, rectal pain and vomiting.   Endocrine: Negative for cold intolerance, heat intolerance, polydipsia, polyphagia and polyuria.   Genitourinary: Negative for decreased urine volume, difficulty urinating, dysuria, enuresis, flank pain, frequency, genital sores, hematuria and urgency.   Musculoskeletal: Negative for arthralgias, back pain, gait problem, joint swelling, myalgias, neck pain and neck  "stiffness.   Skin: Negative for color change, pallor, rash and wound.   Allergic/Immunologic: Negative for environmental allergies, food allergies and immunocompromised state.   Neurological: Negative for dizziness, tremors, seizures, syncope, facial asymmetry, speech difficulty, weakness, light-headedness, numbness and headaches.   Hematological: Negative for adenopathy. Does not bruise/bleed easily.   Psychiatric/Behavioral: Negative for agitation, behavioral problems, confusion, decreased concentration, dysphoric mood, hallucinations, self-injury, sleep disturbance and suicidal ideas. The patient is not nervous/anxious and is not hyperactive.        PE:  /80 (BP Location: Right arm, Patient Position: Sitting, Cuff Size: Adult)   Temp 97.5 °F (36.4 °C) (Temporal)   Ht 188 cm (74.02\")   Wt 97.3 kg (214 lb 9.6 oz)   BMI 27.54 kg/m²     Heart- RRR  Lungs- no wheezing, normal expansion    Wound-well healed    Neurologic Exam  Fait normal, no focal weakness in the legs    MDM  We discussed progression of activities, going to PT and restrictions for the next 6 weeks. All his questions were answered. He will attend PT and call me in 6 weeks with an update on his condition.     Sherri Verma, PAC      "

## 2023-03-31 ENCOUNTER — OFFICE VISIT (OUTPATIENT)
Dept: NEUROSURGERY | Facility: CLINIC | Age: 33
End: 2023-03-31
Payer: MEDICAID

## 2023-03-31 VITALS — BODY MASS INDEX: 28.49 KG/M2 | WEIGHT: 222 LBS | TEMPERATURE: 97.1 F | HEIGHT: 74 IN

## 2023-03-31 DIAGNOSIS — M51.36 DDD (DEGENERATIVE DISC DISEASE), LUMBAR: ICD-10-CM

## 2023-03-31 DIAGNOSIS — Z98.890 HISTORY OF LUMBAR DISCECTOMY: ICD-10-CM

## 2023-03-31 DIAGNOSIS — M54.16 LUMBAR RADICULOPATHY: Primary | ICD-10-CM

## 2023-03-31 PROCEDURE — 1160F RVW MEDS BY RX/DR IN RCRD: CPT | Performed by: NEUROLOGICAL SURGERY

## 2023-03-31 PROCEDURE — 1159F MED LIST DOCD IN RCRD: CPT | Performed by: NEUROLOGICAL SURGERY

## 2023-03-31 PROCEDURE — 99204 OFFICE O/P NEW MOD 45 MIN: CPT | Performed by: NEUROLOGICAL SURGERY

## 2023-03-31 RX ORDER — MONTELUKAST SODIUM 10 MG/1
1 TABLET ORAL DAILY
COMMUNITY
Start: 2023-03-02

## 2023-03-31 RX ORDER — FLUTICASONE PROPIONATE 50 MCG
2 SPRAY, SUSPENSION (ML) NASAL DAILY
COMMUNITY
Start: 2023-03-02

## 2023-03-31 RX ORDER — GABAPENTIN 300 MG/1
CAPSULE ORAL
Qty: 90 CAPSULE | Refills: 1 | Status: SHIPPED | OUTPATIENT
Start: 2023-03-31

## 2023-03-31 RX ORDER — LORATADINE 10 MG/1
1 TABLET ORAL DAILY
COMMUNITY
Start: 2023-03-02

## 2023-03-31 NOTE — PROGRESS NOTES
"Patient: Pardeep Henry  : 1990    Primary Care Provider: Bradley Paz MD    Requesting Provider: As above        History    Chief Complaint: Low back and left leg pain with sensory alteration.    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  On 2019 he underwent left L4-5 discectomy by my former colleague Dr. Tavera.  He generally did well.  About 6-12 months after surgery he began experiencing numbness in his leg and foot.  That has been ongoing.  Intermittently he will get a \"searing\" type of pain that involves the leg.  That can be quite debilitating.  Aleve can be helpful.  He did physical therapy for a while but he actually seemed to do better when that was discontinued.  Protracted sitting on heavy equipment is aggravating.    Review of Systems   Constitutional: Negative for activity change, appetite change, chills, diaphoresis, fatigue, fever and unexpected weight change.   HENT: Negative for congestion, dental problem, drooling, ear discharge, ear pain, facial swelling, hearing loss, mouth sores, nosebleeds, postnasal drip, rhinorrhea, sinus pressure, sinus pain, sneezing, sore throat, tinnitus, trouble swallowing and voice change.    Eyes: Negative for photophobia, pain, discharge, redness, itching and visual disturbance.   Respiratory: Negative for apnea, cough, choking, chest tightness, shortness of breath, wheezing and stridor.    Cardiovascular: Negative for chest pain, palpitations and leg swelling.   Gastrointestinal: Negative for abdominal distention, abdominal pain, anal bleeding, blood in stool, constipation, diarrhea, nausea, rectal pain and vomiting.   Endocrine: Negative for cold intolerance, heat intolerance, polydipsia, polyphagia and polyuria.   Genitourinary: Negative for decreased urine volume, difficulty urinating, dysuria, enuresis, flank pain, frequency, genital sores, hematuria, penile discharge, penile pain, " "penile swelling, scrotal swelling, testicular pain and urgency.   Musculoskeletal: Positive for back pain. Negative for arthralgias, gait problem, joint swelling, myalgias, neck pain and neck stiffness.   Skin: Negative for color change, pallor, rash and wound.   Allergic/Immunologic: Positive for environmental allergies. Negative for food allergies and immunocompromised state.   Neurological: Positive for dizziness, light-headedness and numbness. Negative for tremors, seizures, syncope, facial asymmetry, speech difficulty, weakness and headaches.   Hematological: Negative for adenopathy. Does not bruise/bleed easily.   Psychiatric/Behavioral: Negative for agitation, behavioral problems, confusion, decreased concentration, dysphoric mood, hallucinations, self-injury, sleep disturbance and suicidal ideas. The patient is not nervous/anxious and is not hyperactive.        The patient's past medical history, past surgical history, family history, and social history have been reviewed at length in the electronic medical record.      Physical Exam:   Temp 97.1 °F (36.2 °C) (Infrared)   Ht 188 cm (74.02\")   Wt 101 kg (222 lb)   BMI 28.49 kg/m²   CONSTITUTIONAL: Patient is well-nourished, pleasant and appears stated age.  MUSCULOSKELETAL:  Straight leg raising is negative.  Vinny's Sign is negative.  ROM in the low back is normal.  Tenderness in the back to palpation is not observed.  Well-healed midline lumbosacral incision is noted.  NEUROLOGICAL:  Orientation, memory, attention span, language function, and cognition have been examined and are intact.  Strength is intact in the lower extremities to direct testing.  Muscle tone is normal throughout.  Station and gait are normal.  Sensation is intact to light touch testing throughout.  Deep tendon reflexes are 2+ and symmetrical except for the left ankle reflex which is trace.  Coordination is intact.      Medical Decision Making    Data Review:   (All imaging studies " were personally reviewed unless stated otherwise)  Lumbar MRI study dated 2/24/2023 demonstrates a transitional segment.  I believe based on Dr. Tavera' counting there is a laminotomy defect on the left at L4-5.  There is some granulation tissue and an annular fissure on the left at that level.  I do not see recurrent disc herniation.  There is some diffuse facet arthropathy.    Diagnosis:   1.  Lumbar radiculopathy.  2.  Ongoing left lower extremity numbness that may be a result of granulation tissue given that it began 6-12 months postoperatively.    Treatment Options:   I do not see anything that would require surgical intervention. I do not think there is much to be done for his numbness.  I am going to prescribe gabapentin to try and help with the searing pain that he experiences intermittently.       Diagnosis Plan   1. Lumbar radiculopathy        2. DDD (degenerative disc disease), lumbar        3. History of lumbar discectomy            Scribed for Twin Hogan MD by Pamela Joya, [facility] 3/31/2023 11:42 EDT      I, Dr. Hogan, personally performed the services described in the documentation, as scribed in my presence, and it is both accurate and complete.

## 2023-05-17 ENCOUNTER — OFFICE VISIT (OUTPATIENT)
Dept: NEUROSURGERY | Facility: CLINIC | Age: 33
End: 2023-05-17
Payer: MEDICAID

## 2023-05-17 VITALS — TEMPERATURE: 97.1 F | BODY MASS INDEX: 28.23 KG/M2 | WEIGHT: 220 LBS | HEIGHT: 74 IN

## 2023-05-17 DIAGNOSIS — M54.16 LUMBAR RADICULOPATHY: Primary | ICD-10-CM

## 2023-05-17 DIAGNOSIS — M51.36 DDD (DEGENERATIVE DISC DISEASE), LUMBAR: ICD-10-CM

## 2023-05-17 DIAGNOSIS — Z98.890 HISTORY OF LUMBAR DISCECTOMY: ICD-10-CM

## 2023-05-17 DIAGNOSIS — M54.16 LUMBAR RADICULOPATHY: ICD-10-CM

## 2023-05-17 RX ORDER — GABAPENTIN 300 MG/1
CAPSULE ORAL
Qty: 90 CAPSULE | Refills: 1 | Status: SHIPPED | OUTPATIENT
Start: 2023-05-17 | End: 2023-06-19

## 2023-05-17 NOTE — PROGRESS NOTES
"     Office Visit      Date: 2023  Patient Name: Pardeep Henry  : 1990   MRN: [id number]     Chief Complaint:    Chief Complaint   Patient presents with   • Leg Pain   • Numbness     LLE   • Tingling       History of Present Illness: Pardeep Henry is a 33 y.o. male who is well-known to the neurosurgical practice.  Wally farmer, with a history of left L4-L5 discectomy performed by Dr. Tavera on 2019.  Was last seen by Dr. Hogan in the neurosurgical outpatient clinic on 3/31/2023.  He was complaining of some associated numbness in his leg and foot on the left side, this has been going on intermittently patient states he will get a \"searing\" type pain that involves the left leg.  Which can be quite debilitating, over-the-counter pain medications are helpful, he did some physical therapy and actually his symptoms improved.  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  On his most recent MRI of the lumbar spine, there is a transitional segment, with a laminotomy defect at the left L4-L5, with some granulation tissue and annular fissure on the left at that level.  There is no recurrent disc herniation, there is some diffuse facet arthropathy throughout.  He was found to be a nonneurosurgical candidate by Dr. Hogan at the last visit and was started on gabapentin for his lumbar radiculopathy/numbness.  Presents today for 6-week follow-up.  Patient states he is actually doing quite well since starting the gabapentin, patient states that his \"searing\" left leg pain that was present prior has since but abated.  Patient still has some lower back pain at baseline, can have occasional lumbar radiculopathy but is nowhere near as debilitating as it was prior.  Patient also surprisingly states that his numbness in his left lower extremity has also gotten better.  Patient denies any upper or " lower extremity weakness, denies any urinary bladder or bowel dysfunction.    Subjective   Review of Systems:  Review of Systems   Constitutional: Negative.    HENT: Negative.    Eyes: Negative.    Respiratory: Negative.    Cardiovascular: Negative.    Gastrointestinal: Negative.    Endocrine: Negative.    Genitourinary: Negative.    Musculoskeletal: Positive for back pain.   Skin: Negative.    Neurological: Positive for numbness.        LLE (comes and goes)   LLE Pain   Hematological: Negative.    Psychiatric/Behavioral: Positive for decreased concentration.   All other systems reviewed and are negative.       Past Medical History:  Past Medical History:   Diagnosis Date   • Low back pain 2017   • Lumbar radiculopathy 5/17/2023   • Lumbosacral disc disease 2017       Past Surgical History:  Past Surgical History:   Procedure Laterality Date   • BACK SURGERY  2019   • CHOLECYSTECTOMY     • KNEE SURGERY      x3   • LUMBAR DISCECTOMY Left 01/23/2019    Procedure: LUMBAR DISCECTOMY L4-5 LEFT;  Surgeon: Linus Tavera MD;  Location: [facility];  Service: Neurosurgery   • OTHER SURGICAL HISTORY      pyloric stenosis surgery as an infant        Medications    Current Outpatient Medications:   •  fluticasone (FLONASE) 50 MCG/ACT nasal spray, 2 sprays by Each Nare route Daily., Disp: , Rfl:   •  montelukast (SINGULAIR) 10 MG tablet, Take 1 tablet by mouth Daily., Disp: , Rfl:   •  gabapentin (NEURONTIN) 300 MG capsule, Take 1 capsule by mouth Every Night for 3 days, THEN 1 capsule 2 (Two) Times a Day for 3 days, THEN 1 capsule 3 (Three) Times a Day for 27 days., Disp: 90 capsule, Rfl: 1    Allergies:  No Known Allergies    Social Hx:  Social History     Tobacco Use   • Smoking status: Never   • Smokeless tobacco: Never   Vaping Use   • Vaping Use: Never used   Substance Use Topics   • Alcohol use: Yes     Alcohol/week: 5.0 standard drinks     Types: 4 Cans of beer, 1 Shots of liquor per week     Comment: rare   • Drug  use: Never       Family Hx:  Family History   Problem Relation Age of Onset   • Alcohol abuse Father    • Drug abuse Sister        Objective     Vitals:    05/17/23 1339   Temp: 97.1 °F (36.2 °C)     Body mass index is 28.23 kg/m².    Physical examination:  General Appearance:  Well developed, well nourished, well groomed, alert, and cooperative.  HEENT- normocephalic, atraumatic, sclera clear  Lungs-normal expansion, no wheezing  Heart-regular rate and rhythm  Extremities-positive pulses, no edema    Neurologic Exam  WDWN  A/A/C, speech clear, attention normal, conversant, answers questions appropriately, good historian.  Cranial nerves II through XII are intact.  Motor examination does not reveal weakness in the , upper or lower extremities.   Sensation is intact.  Gait is normal, balance is normal.   No tremors are noted.  Reflexes are intact and symmetrical except for left ankle reflex which is trace  Rico is negative. Clonus is negative.   Negative tenderness to palpation lumbar spine      Review of imaging: Patient's most recent MRI of the lumbar spine that was performed on 2/24/2023, was not directly available for my interrogation    Assessment & Plan   1. Lumbar radiculopathy    2. DDD (degenerative disc disease), lumbar    3. History of lumbar discectomy      Plan: I discussed this patient's case with Dr. Hogan.  Since the patient is doing quite well with starting gabapentin 300 mg p.o. 3 times daily, we will keep the patient on this and I have actually sent in some refills for the patient.  Patient was deemed not a neurosurgical candidate by Dr. Hogan at the last visit.,  Since his lumbar radiculopathy with ongoing left lower extremity numbness that found to be a result of granulation tissue from his prior L4-L5 left discectomy in 2019 is doing better.  I think we continue to treat this patient with conservative measures.  No need for any formal follow-up currently the patient can follow-up on an  as-needed basis.  Instructed the patient continue with physical therapy, stretches, exercises and with his Neurontin therapy as currently prescribed.  Encouraged the patient if he experiences any acute change in symptoms, or any of the signs and symptoms of be coincided with cauda equina syndrome such as urinary bladder or bowel dysfunction, saddle anesthesia, acute lower extremity weakness that he is to call our office and/or 911 report the nearest emergency room.      Estiven Wilburn PA-C  [facility] [address] [address]  Fax [phone]  Phone [phone]

## 2023-05-30 DIAGNOSIS — M54.16 LUMBAR RADICULOPATHY: ICD-10-CM

## 2023-05-30 RX ORDER — GABAPENTIN 300 MG/1
CAPSULE ORAL
Qty: 90 CAPSULE | Refills: 1 | OUTPATIENT
Start: 2023-05-30 | End: 2023-07-02

## 2023-05-30 NOTE — TELEPHONE ENCOUNTER
Provider:  Ed[place]  Surgery/Procedure:   LUMBAR DISCECTOMY L4-5 LEFT  Surgery/Procedure Date:  1-  Last visit:   5-  Office Visit with Estiven Wilburn PA-C (05/17/2023)    Next visit: NA     Reason for call: pharmacy has faxed over a refill request for Gabapentin. Please Advise. Thank you.    KAY:  Pat ID Date Filled RX # Drug Name Prescriber Name Dispenser Name Qty Days MED PMT Rpt To  1 05/03/2023 [id number] Gabapentin 300MG Twin Hogan [place] [facility] 90 30 02 [place]  Date Written New/Refill Dosage Form Prescriber [facility]  03/31/2023 Refill [facility]  Pat ID Date Filled RX # Drug Name Prescriber Name Dispenser Name Qty Days MED PMT Rpt To  1 05/03/2023 [id number] Gabapentin 300MG Twin Hogan [facility] 90 30 02 [place]  Date Written New/Refill Dosage Form Prescriber [facility]  03/31/2023 Refill [facility]  Pat ID Date Filled RX # Drug Name Prescriber Name Dispenser Name Qty Days MED PMT Rpt To  1 03/31/2023 [id number] Gabapentin 300MG Twin Hogan [place] [facility] 90 33 02 [place]  Date Written New/Refill Dosage Form Prescriber [facility]  03/31/2023 [facility]  Pat ID Date Filled RX # Drug Name Prescriber Name Dispenser Name Qty Days MED PMT Rpt To  1 03/31/2023 [id number] Gabapentin 300MG Twin Hogan [place] [facility] 90 33 02 [place]  Date Written New/Refill Dosage Form Prescriber [facility]  03/31/2023 [facility]      Requested Prescriptions     Pending Prescriptions Disp Refills   • gabapentin (NEURONTIN) 300 MG capsule 90 capsule 1     Sig: Take 1 capsule by mouth Every Night for 3 days, THEN 1 capsule 2 (Two) Times a Day for 3 days, THEN 1 capsule 3 (Three) Times a Day for 27 days.

## 2023-06-13 DIAGNOSIS — M54.16 LUMBAR RADICULOPATHY: ICD-10-CM

## 2023-06-13 RX ORDER — GABAPENTIN 300 MG/1
300 CAPSULE ORAL 3 TIMES DAILY
Qty: 90 CAPSULE | Refills: 1 | Status: SHIPPED | OUTPATIENT
Start: 2023-06-13

## 2023-06-13 NOTE — TELEPHONE ENCOUNTER
Provider:  Ed[place]  Surgery/Procedure:  LUMBAR DISCECTOMY L4-5 LEFT   Surgery/Procedure Date: 1-  Last visit:   5-  Next visit: na     Reason for call: Pharmacy faxed over a refill request for Gabapentin. Please Advise. Thank you.      Requested Prescriptions     Pending Prescriptions Disp Refills    gabapentin (NEURONTIN) 300 MG capsule 90 capsule 1     Sig: Take 1 capsule by mouth 3 (Three) Times a Day.            KAY: Pat ID Date Filled RX # Drug Name Prescriber Name Dispenser Name Qty Days MED PMT Rpt To  1 06/06/2023 [id number] Gabapentin 300MG Ghazal Dominique [place] [facility] 90 30 02 [place]  Date Written New/Refill Dosage Form Prescriber [facility]  06/06/2023 [facility]  Pat ID Date Filled RX # Drug Name Prescriber Name Dispenser Name Qty Days MED PMT Rpt To  1 05/03/2023 [id number] Gabapentin 300MG Twin Hogan [place] [facility] 90 30 02 [place]  Date Written New/Refill Dosage Form Prescriber [facility]  03/31/2023 Refill [facility]  Pat ID Date Filled RX # Drug Name Prescriber Name Dispenser Name Qty Days MED PMT Rpt To  1 05/03/2023 [id number] Gabapentin 300MG Twin Hogan [place] [facility] 90 30 02 [place]  Date Written New/Refill Dosage Form Prescriber [facility]  03/31/2023 Refill [facility]  Pat ID Date Filled RX # Drug Name Prescriber Name Dispenser Name Qty Days MED PMT Rpt To  1 03/31/2023 [id number] Gabapentin 300MG Twin Hogan [place] [facility] 90 33 02 [place]  Date Written New/Refill Dosage Form Prescriber [facility]  03/31/2023 [facility]  Pat ID Date Filled RX # Drug Name Prescriber Name Dispenser Name Qty Days MED PMT Rpt To  1 03/31/2023 [id number] Gabapentin 300MG Twin Hogan [place] [facility] 90 33 02 [place]  06/13/2023

## (undated) DEVICE — SPNG GZ WOVN 4X4IN 12PLY 10/BX STRL

## (undated) DEVICE — ELECTRD BLD EZ CLN STD 2.5IN

## (undated) DEVICE — 3M™ MEDIPORE™ H SOFT CLOTH SURGICAL TAPE 2862, 2 INCH X 10 YARD (5CM X 9,1M), 12 ROLLS/CASE: Brand: 3M™ MEDIPORE™

## (undated) DEVICE — HDRST INTUB GENTLETOUCH SLOT 7IN RT

## (undated) DEVICE — HOLDER: Brand: DEROYAL

## (undated) DEVICE — TOOL 14MH30 LEGEND 14CM 3MM: Brand: MIDAS REX ™

## (undated) DEVICE — 3M™ STERI-STRIP™ ANTIMICROBIAL SKIN CLOSURES 1/2 INCH X 4 INCHES 50/CARTON 4 CARTONS/CASE A1847: Brand: 3M™ STERI-STRIP™

## (undated) DEVICE — ULTRACLEAN ACCESSORY ELECTRODE 4" (10.16 CM) COATED BLADE: Brand: ULTRACLEAN

## (undated) DEVICE — ADHS LIQ MASTISOL 2/3ML

## (undated) DEVICE — ENCORE® LATEX MICRO SIZE 6.5, STERILE LATEX POWDER-FREE SURGICAL GLOVE: Brand: ENCORE

## (undated) DEVICE — 3M™ STERI-STRIP™ REINFORCED ADHESIVE SKIN CLOSURES, R1547, 1/2 IN X 4 IN (12 MM X 100 MM), 6 STRIPS/ENVELOPE: Brand: 3M™ STERI-STRIP™

## (undated) DEVICE — 2963 MEDIPORE SOFT CLOTH TAPE 3 IN X 10 YD 12 RLS/CS: Brand: 3M™ MEDIPORE™

## (undated) DEVICE — ANTIBACTERIAL UNDYED BRAIDED (POLYGLACTIN 910), SYNTHETIC ABSORBABLE SUTURE: Brand: COATED VICRYL

## (undated) DEVICE — ENCORE® LATEX MICRO SIZE 7.5, STERILE LATEX POWDER-FREE SURGICAL GLOVE: Brand: ENCORE

## (undated) DEVICE — ENCORE® LATEX MICRO SIZE 7, STERILE LATEX POWDER-FREE SURGICAL GLOVE: Brand: ENCORE

## (undated) DEVICE — NDL SPINE 18G 31/2IN PNK

## (undated) DEVICE — SPNG GZ STRL 2S 4X4 12PLY

## (undated) DEVICE — PK NEURO DISC 10

## (undated) DEVICE — 3M™ STERI-DRAPE™ INSTRUMENT POUCH 1018: Brand: STERI-DRAPE™

## (undated) DEVICE — ACCY PA700 LUBRICANT DIFFUSER MR7 4 PACK: Brand: MIDAS REX

## (undated) DEVICE — 3M™ WARMING BLANKET, UPPER BODY, 10 PER CASE, 42268: Brand: BAIR HUGGER™

## (undated) DEVICE — DRSNG TELFA PAD NONADH STR 1S 3X8IN

## (undated) DEVICE — CVR HNDL LT SURG ACCSSRY BLU STRL